# Patient Record
Sex: MALE | Race: WHITE | NOT HISPANIC OR LATINO | URBAN - METROPOLITAN AREA
[De-identification: names, ages, dates, MRNs, and addresses within clinical notes are randomized per-mention and may not be internally consistent; named-entity substitution may affect disease eponyms.]

---

## 2021-04-19 ENCOUNTER — INPATIENT (INPATIENT)
Facility: HOSPITAL | Age: 71
LOS: 7 days | Discharge: HOME | DRG: 445 | End: 2021-04-27
Attending: SURGERY | Admitting: SURGERY
Payer: COMMERCIAL

## 2021-04-19 VITALS
DIASTOLIC BLOOD PRESSURE: 92 MMHG | RESPIRATION RATE: 18 BRPM | HEIGHT: 72 IN | SYSTOLIC BLOOD PRESSURE: 172 MMHG | HEART RATE: 90 BPM | OXYGEN SATURATION: 97 % | TEMPERATURE: 98 F

## 2021-04-19 LAB
ALBUMIN SERPL ELPH-MCNC: 3.1 G/DL — LOW (ref 3.5–5)
ALP SERPL-CCNC: 82 U/L — SIGNIFICANT CHANGE UP (ref 40–120)
ALT FLD-CCNC: 35 U/L DA — SIGNIFICANT CHANGE UP (ref 10–60)
ANION GAP SERPL CALC-SCNC: 7 MMOL/L — SIGNIFICANT CHANGE UP (ref 5–17)
APPEARANCE UR: CLEAR — SIGNIFICANT CHANGE UP
APTT BLD: 37.5 SEC — HIGH (ref 27.5–35.5)
AST SERPL-CCNC: 30 U/L — SIGNIFICANT CHANGE UP (ref 10–40)
BASOPHILS # BLD AUTO: 0.05 K/UL — SIGNIFICANT CHANGE UP (ref 0–0.2)
BASOPHILS NFR BLD AUTO: 0.3 % — SIGNIFICANT CHANGE UP (ref 0–2)
BILIRUB SERPL-MCNC: 0.3 MG/DL — SIGNIFICANT CHANGE UP (ref 0.2–1.2)
BILIRUB UR-MCNC: NEGATIVE — SIGNIFICANT CHANGE UP
BUN SERPL-MCNC: 17 MG/DL — SIGNIFICANT CHANGE UP (ref 7–18)
CALCIUM SERPL-MCNC: 8.8 MG/DL — SIGNIFICANT CHANGE UP (ref 8.4–10.5)
CHLORIDE SERPL-SCNC: 101 MMOL/L — SIGNIFICANT CHANGE UP (ref 96–108)
CO2 SERPL-SCNC: 26 MMOL/L — SIGNIFICANT CHANGE UP (ref 22–31)
COLOR SPEC: ABNORMAL
CREAT SERPL-MCNC: 1.4 MG/DL — HIGH (ref 0.5–1.3)
DIFF PNL FLD: NEGATIVE — SIGNIFICANT CHANGE UP
EOSINOPHIL # BLD AUTO: 0.1 K/UL — SIGNIFICANT CHANGE UP (ref 0–0.5)
EOSINOPHIL NFR BLD AUTO: 0.7 % — SIGNIFICANT CHANGE UP (ref 0–6)
GLUCOSE SERPL-MCNC: 126 MG/DL — HIGH (ref 70–99)
GLUCOSE UR QL: NEGATIVE — SIGNIFICANT CHANGE UP
HCT VFR BLD CALC: 38.4 % — LOW (ref 39–50)
HGB BLD-MCNC: 12.3 G/DL — LOW (ref 13–17)
IMM GRANULOCYTES NFR BLD AUTO: 1.1 % — SIGNIFICANT CHANGE UP (ref 0–1.5)
INR BLD: 1.34 RATIO — HIGH (ref 0.88–1.16)
KETONES UR-MCNC: ABNORMAL
LACTATE SERPL-SCNC: 1.3 MMOL/L — SIGNIFICANT CHANGE UP (ref 0.7–2)
LEUKOCYTE ESTERASE UR-ACNC: ABNORMAL
LIDOCAIN IGE QN: 147 U/L — SIGNIFICANT CHANGE UP (ref 73–393)
LYMPHOCYTES # BLD AUTO: 0.94 K/UL — LOW (ref 1–3.3)
LYMPHOCYTES # BLD AUTO: 6.5 % — LOW (ref 13–44)
MCHC RBC-ENTMCNC: 30.4 PG — SIGNIFICANT CHANGE UP (ref 27–34)
MCHC RBC-ENTMCNC: 32 GM/DL — SIGNIFICANT CHANGE UP (ref 32–36)
MCV RBC AUTO: 95 FL — SIGNIFICANT CHANGE UP (ref 80–100)
MONOCYTES # BLD AUTO: 0.99 K/UL — HIGH (ref 0–0.9)
MONOCYTES NFR BLD AUTO: 6.9 % — SIGNIFICANT CHANGE UP (ref 2–14)
NEUTROPHILS # BLD AUTO: 12.15 K/UL — HIGH (ref 1.8–7.4)
NEUTROPHILS NFR BLD AUTO: 84.5 % — HIGH (ref 43–77)
NITRITE UR-MCNC: POSITIVE
NRBC # BLD: 0 /100 WBCS — SIGNIFICANT CHANGE UP (ref 0–0)
PH UR: 5 — SIGNIFICANT CHANGE UP (ref 5–8)
PLATELET # BLD AUTO: 374 K/UL — SIGNIFICANT CHANGE UP (ref 150–400)
POTASSIUM SERPL-MCNC: 3.4 MMOL/L — LOW (ref 3.5–5.3)
POTASSIUM SERPL-SCNC: 3.4 MMOL/L — LOW (ref 3.5–5.3)
PROT SERPL-MCNC: 7.3 G/DL — SIGNIFICANT CHANGE UP (ref 6–8.3)
PROT UR-MCNC: 30 MG/DL
PROTHROM AB SERPL-ACNC: 15.7 SEC — HIGH (ref 10.6–13.6)
RBC # BLD: 4.04 M/UL — LOW (ref 4.2–5.8)
RBC # FLD: 12.2 % — SIGNIFICANT CHANGE UP (ref 10.3–14.5)
SARS-COV-2 RNA SPEC QL NAA+PROBE: SIGNIFICANT CHANGE UP
SODIUM SERPL-SCNC: 134 MMOL/L — LOW (ref 135–145)
SP GR SPEC: 1.02 — SIGNIFICANT CHANGE UP (ref 1.01–1.02)
TROPONIN I SERPL-MCNC: <0.015 NG/ML — SIGNIFICANT CHANGE UP (ref 0–0.04)
UROBILINOGEN FLD QL: 1
WBC # BLD: 14.39 K/UL — HIGH (ref 3.8–10.5)
WBC # FLD AUTO: 14.39 K/UL — HIGH (ref 3.8–10.5)

## 2021-04-19 PROCEDURE — 71045 X-RAY EXAM CHEST 1 VIEW: CPT | Mod: 26

## 2021-04-19 PROCEDURE — 74177 CT ABD & PELVIS W/CONTRAST: CPT | Mod: 26,MA

## 2021-04-19 PROCEDURE — 99285 EMERGENCY DEPT VISIT HI MDM: CPT

## 2021-04-19 PROCEDURE — 76705 ECHO EXAM OF ABDOMEN: CPT | Mod: 26

## 2021-04-19 RX ORDER — CEFTRIAXONE 500 MG/1
1000 INJECTION, POWDER, FOR SOLUTION INTRAMUSCULAR; INTRAVENOUS ONCE
Refills: 0 | Status: COMPLETED | OUTPATIENT
Start: 2021-04-19 | End: 2021-04-19

## 2021-04-19 RX ORDER — MORPHINE SULFATE 50 MG/1
4 CAPSULE, EXTENDED RELEASE ORAL ONCE
Refills: 0 | Status: DISCONTINUED | OUTPATIENT
Start: 2021-04-19 | End: 2021-04-19

## 2021-04-19 RX ADMIN — MORPHINE SULFATE 4 MILLIGRAM(S): 50 CAPSULE, EXTENDED RELEASE ORAL at 20:18

## 2021-04-19 RX ADMIN — CEFTRIAXONE 100 MILLIGRAM(S): 500 INJECTION, POWDER, FOR SOLUTION INTRAMUSCULAR; INTRAVENOUS at 20:51

## 2021-04-19 RX ADMIN — MORPHINE SULFATE 4 MILLIGRAM(S): 50 CAPSULE, EXTENDED RELEASE ORAL at 20:44

## 2021-04-19 NOTE — ED PROVIDER NOTE - PHYSICAL EXAMINATION
Afebrile, hemodynamically stable, saturating well  NAD, well appearing, sitting comfortably in bed  Head NCAT  EOMI grossly, anicteric  MMM  No JVD  RRR, nml S1/S2, no m/r/g  Lungs CTAB, no w/r/r  Abd soft, RUQ TTP +guarding +Contreras's, ND, nml BS, no CVAT  AAO, CN's 3-12 grossly intact  POLANCO spontaneously, no leg cyanosis or edema  Skin warm, well perfused, no rashes or hives

## 2021-04-19 NOTE — ED PROVIDER NOTE - OBJECTIVE STATEMENT
Declines , uses son Yaya ([phone]).  71yoM with h/o HTN, HLD, presents with RUQ abdominal pain radiating to R flank x 2 days, constant, worsened by leaning over. Took Tylenol prior to arrival. Labs with PMD Cioczek on 4/12 showed WBC 13, BUN/Cr 44/1.8 and prescribed Augmentin/Flagyl/omeprazole. Associated watery diarrhea x1 wk. Denies CP, SOB, vomiting, bloody stool, dys/hematuria, fever, and all other symptoms.

## 2021-04-19 NOTE — ED ADULT NURSE NOTE - CAS EDP DISCH DISPOSITION ADMI
S/W radiology resident on call ([phone]), as per resident there is no thrombosis seen on CT venogram.  Lovenox will be discontinued     VICTOR M ADAMS [id number] HOLDING

## 2021-04-19 NOTE — ED PROVIDER NOTE - CARE PLAN
Principal Discharge DX:	Abscess, gallbladder   Principal Discharge DX:	Abscess, gallbladder  Secondary Diagnosis:	UTI (urinary tract infection)

## 2021-04-19 NOTE — ED PROVIDER NOTE - CLINICAL SUMMARY MEDICAL DECISION MAKING FREE TEXT BOX
No CP/SOB to suggest ACS or PE and no e/o DVT or risk factors. Character and appearance low suspicion for dissection or AAA. No CP/SOB to suggest ACS or PE and no e/o DVT or risk factors. Character and appearance low suspicion for dissection or AAA. Noted +UTI and may be pyelo though no CVAT or urinary symptoms, given abx. Concern for cholecystitis by positioning and exam. Labs unremarkable in this regard. US difficult to assess. No CP/SOB to suggest ACS or PE and no e/o DVT or risk factors. Character and appearance low suspicion for dissection or AAA. Noted +UTI and may be pyelo though no CVAT or urinary symptoms, given abx. Concern for cholecystitis by positioning and exam. Labs unremarkable in this regard. US difficult to assess. Signed off care to Dr. NICOLASA Gusman who will f/u CT and reassess.

## 2021-04-19 NOTE — ED PROVIDER NOTE - PROGRESS NOTE DETAILS
Signed off care to Dr. NICOLASA Gusman who will f/u CT and reassess. patient signeodut to me by Dr. martinez at 11pm. awaiting CT A/P.  CT shows Right upper quadrant inflammatory change at the hepatic flexure with 2 rim-enhancing fluid collections noted. There is nodular soft tissue extending between the hepatic flexure, gastric antrum, and left hepatic lobe.Normal gallbladder is not seen. Query prior cholecystectomy versus contracted gallbladder.Differential for the constellation of findings includes perforated cholecystitis, infectious process involving the hepatic flexure, or malignancy (such as gallbladder, colon, or less likely gastric origin).Indeterminant left renal lesion may represent a hyperdense cyst, however, this can be evaluated with a dedicated renal ultrasound on a nonemergent basis.  @1230am consulted surgical house officer YUDI Ernandez who agrees with plan for surgical admission.  discussed above with patient and son over phone. Awilda CHARLES

## 2021-04-20 DIAGNOSIS — K81.0 ACUTE CHOLECYSTITIS: ICD-10-CM

## 2021-04-20 LAB
CANCER AG19-9 SERPL-ACNC: 4 U/ML — SIGNIFICANT CHANGE UP
CEA SERPL-MCNC: 0.8 NG/ML — SIGNIFICANT CHANGE UP (ref 0–3.8)
HCT VFR BLD CALC: 33 % — LOW (ref 39–50)
HGB BLD-MCNC: 10.7 G/DL — LOW (ref 13–17)
MCHC RBC-ENTMCNC: 31.4 PG — SIGNIFICANT CHANGE UP (ref 27–34)
MCHC RBC-ENTMCNC: 32.4 GM/DL — SIGNIFICANT CHANGE UP (ref 32–36)
MCV RBC AUTO: 96.8 FL — SIGNIFICANT CHANGE UP (ref 80–100)
NRBC # BLD: 0 /100 WBCS — SIGNIFICANT CHANGE UP (ref 0–0)
PLATELET # BLD AUTO: 317 K/UL — SIGNIFICANT CHANGE UP (ref 150–400)
RBC # BLD: 3.41 M/UL — LOW (ref 4.2–5.8)
RBC # FLD: 12.7 % — SIGNIFICANT CHANGE UP (ref 10.3–14.5)
WBC # BLD: 10.83 K/UL — HIGH (ref 3.8–10.5)
WBC # FLD AUTO: 10.83 K/UL — HIGH (ref 3.8–10.5)

## 2021-04-20 PROCEDURE — 99222 1ST HOSP IP/OBS MODERATE 55: CPT

## 2021-04-20 RX ORDER — PIPERACILLIN AND TAZOBACTAM 4; .5 G/20ML; G/20ML
3.38 INJECTION, POWDER, LYOPHILIZED, FOR SOLUTION INTRAVENOUS EVERY 8 HOURS
Refills: 0 | Status: COMPLETED | OUTPATIENT
Start: 2021-04-20 | End: 2021-04-26

## 2021-04-20 RX ORDER — KETOROLAC TROMETHAMINE 30 MG/ML
15 SYRINGE (ML) INJECTION EVERY 6 HOURS
Refills: 0 | Status: DISCONTINUED | OUTPATIENT
Start: 2021-04-20 | End: 2021-04-20

## 2021-04-20 RX ORDER — PIPERACILLIN AND TAZOBACTAM 4; .5 G/20ML; G/20ML
3.38 INJECTION, POWDER, LYOPHILIZED, FOR SOLUTION INTRAVENOUS ONCE
Refills: 0 | Status: COMPLETED | OUTPATIENT
Start: 2021-04-20 | End: 2021-04-20

## 2021-04-20 RX ORDER — ONDANSETRON 8 MG/1
4 TABLET, FILM COATED ORAL EVERY 6 HOURS
Refills: 0 | Status: DISCONTINUED | OUTPATIENT
Start: 2021-04-20 | End: 2021-04-27

## 2021-04-20 RX ORDER — DEXTROSE MONOHYDRATE, SODIUM CHLORIDE, AND POTASSIUM CHLORIDE 50; .745; 4.5 G/1000ML; G/1000ML; G/1000ML
1000 INJECTION, SOLUTION INTRAVENOUS
Refills: 0 | Status: DISCONTINUED | OUTPATIENT
Start: 2021-04-20 | End: 2021-04-27

## 2021-04-20 RX ORDER — HEPARIN SODIUM 5000 [USP'U]/ML
5000 INJECTION INTRAVENOUS; SUBCUTANEOUS EVERY 12 HOURS
Refills: 0 | Status: DISCONTINUED | OUTPATIENT
Start: 2021-04-20 | End: 2021-04-22

## 2021-04-20 RX ORDER — SODIUM CHLORIDE 9 MG/ML
500 INJECTION, SOLUTION INTRAVENOUS
Refills: 0 | Status: DISCONTINUED | OUTPATIENT
Start: 2021-04-20 | End: 2021-04-27

## 2021-04-20 RX ADMIN — SODIUM CHLORIDE 999 MILLILITER(S): 9 INJECTION, SOLUTION INTRAVENOUS at 03:48

## 2021-04-20 RX ADMIN — PIPERACILLIN AND TAZOBACTAM 25 GRAM(S): 4; .5 INJECTION, POWDER, LYOPHILIZED, FOR SOLUTION INTRAVENOUS at 17:11

## 2021-04-20 RX ADMIN — Medication 15 MILLIGRAM(S): at 13:48

## 2021-04-20 RX ADMIN — Medication 15 MILLIGRAM(S): at 05:50

## 2021-04-20 RX ADMIN — PIPERACILLIN AND TAZOBACTAM 200 GRAM(S): 4; .5 INJECTION, POWDER, LYOPHILIZED, FOR SOLUTION INTRAVENOUS at 00:45

## 2021-04-20 RX ADMIN — PIPERACILLIN AND TAZOBACTAM 25 GRAM(S): 4; .5 INJECTION, POWDER, LYOPHILIZED, FOR SOLUTION INTRAVENOUS at 08:05

## 2021-04-20 RX ADMIN — HEPARIN SODIUM 5000 UNIT(S): 5000 INJECTION INTRAVENOUS; SUBCUTANEOUS at 17:11

## 2021-04-20 RX ADMIN — DEXTROSE MONOHYDRATE, SODIUM CHLORIDE, AND POTASSIUM CHLORIDE 110 MILLILITER(S): 50; .745; 4.5 INJECTION, SOLUTION INTRAVENOUS at 05:23

## 2021-04-20 RX ADMIN — Medication 15 MILLIGRAM(S): at 05:23

## 2021-04-20 RX ADMIN — Medication 15 MILLIGRAM(S): at 14:10

## 2021-04-20 RX ADMIN — HEPARIN SODIUM 5000 UNIT(S): 5000 INJECTION INTRAVENOUS; SUBCUTANEOUS at 05:34

## 2021-04-20 RX ADMIN — PIPERACILLIN AND TAZOBACTAM 25 GRAM(S): 4; .5 INJECTION, POWDER, LYOPHILIZED, FOR SOLUTION INTRAVENOUS at 23:57

## 2021-04-20 NOTE — PROGRESS NOTE ADULT - ASSESSMENT
possible perforated cholecystitis with abcess, focal RUQ tenderness only    iv abx  non operative management at present time.

## 2021-04-20 NOTE — PROGRESS NOTE ADULT - SUBJECTIVE AND OBJECTIVE BOX
Pt c/o ruq pain no n/v  ICU Vital Signs Last 24 Hrs  T(C): 36.8 (20 Apr 2021 04:48), Max: 36.8 (20 Apr 2021 04:48)  T(F): 98.2 (20 Apr 2021 04:48), Max: 98.2 (20 Apr 2021 04:48)  HR: 79 (20 Apr 2021 04:48) (79 - 90)  BP: 158/70 (20 Apr 2021 04:48) (135/77 - 172/92)  BP(mean): --  ABP: --  ABP(mean): --  RR: 18 (20 Apr 2021 04:48) (16 - 18)  SpO2: 100% (20 Apr 2021 04:48) (95% - 100%)  Alert nad  Abd: soft +ruq tenderness +murphys sign                          12.3   14.39 )-----------( 374      ( 19 Apr 2021 20:24 )             38.4     04-19    134<L>  |  101  |  17  ----------------------------<  126<H>  3.4<L>   |  26  |  1.40<H>    Ca    8.8      19 Apr 2021 20:24    TPro  7.3  /  Alb  3.1<L>  /  TBili  0.3  /  DBili  x   /  AST  30  /  ALT  35  /  AlkPhos  82  04-19

## 2021-04-20 NOTE — H&P ADULT - HISTORY OF PRESENT ILLNESS
71yoM with PMHx of HTN, HLD, presents to the ED with Abdominal Pain x 2 days. Started suddenly pain for about 1 week but increasingly worse in the last 2 days , Located in the RUQ  ,described as constant, worsened by leaning over.  Denies CP, SOB, vomiting, bloody stool, dys/hematuria, fever, and all other symptoms at this time

## 2021-04-20 NOTE — H&P ADULT - ASSESSMENT
71yoM with PMHx of HTN, HLD, presents to the ED with Abdominal Pain . CT is equivocal for perforated edna vs possible malignancy of hepatobiliary origin. +montoya sign. Afebrile, No LFTs +mild leucocytosis.     Admit to Dr Reed surgical services  NPO, IVF   IV ABx  GB US  f/up labs inc tumor markers   DVT PPx 71yoM with PMHx of HTN, HLD, presents to the ED with Abdominal Pain . CT is equivocal for perforated edna vs infectious process vs possible malignancy of hepatobiliary origin. +montoya sign. Afebrile, No LFTs +mild leucocytosis.     Admit to Dr Reed surgical services  NPO, IVF   IV ABx  GB US  f/up labs inc tumor markers   DVT PPx 71yoM with PMHx of HTN, HLD, presents to the ED with Abdominal Pain . CT is equivocal for perforated edna vs infectious process vs possible malignancy of hepatobiliary origin. +montoya sign. Afebrile, No LFTs +mild leucocytosis.     Admit to Dr Reed surgical services  NPO, IVF   IV ABx  f/up labs inc tumor markers   DVT PPx

## 2021-04-20 NOTE — H&P ADULT - NSHPPHYSICALEXAM_GEN_ALL_CORE
Vital Signs Last 24 Hrs  T(C): 36.7 (19 Apr 2021 23:58), Max: 36.7 (19 Apr 2021 23:58)  T(F): 98 (19 Apr 2021 23:58), Max: 98 (19 Apr 2021 23:58)  HR: 81 (19 Apr 2021 23:58) (81 - 90)  BP: 135/77 (19 Apr 2021 23:58) (135/77 - 172/92)  BP(mean): --  RR: 16 (19 Apr 2021 23:58) (16 - 18)  SpO2: 95% (19 Apr 2021 23:58) (95% - 97%)    General:  A&Ox3,Appears stated age, No acute distress,  Head: NC/AT  EENT: PERRLA. EOMI. Conjunctiva and sclera clear. Pharynx clear.  Neck: Supple. No JVD  Lungs: CTA B/l. Nonlabored Respirations  CV: +S1S2, RRR  Abdomen: Soft, Nondistended,  +TTP in the RUQ, no guarding, no rebound  Extremities: Warm and well perfused. 2+ peripheral pulses b/l. Calf soft, nontender b/l. No pedal edema.

## 2021-04-20 NOTE — H&P ADULT - NSHPLABSRESULTS_GEN_ALL_CORE
12.3   14.39 )-----------( 374      ( 19 Apr 2021 20:24 )             38.4   04-19    134<L>  |  101  |  17  ----------------------------<  126<H>  3.4<L>   |  26  |  1.40<H>    Ca    8.8      19 Apr 2021 20:24    TPro  7.3  /  Alb  3.1<L>  /  TBili  0.3  /  DBili  x   /  AST  30  /  ALT  35  /  AlkPhos  82  04-19    < from: CT Abdomen and Pelvis w/ IV Cont (04.19.21 @ 23:08) >    FINDINGS:  LOWER CHEST: Tiny hiatalhernia.    LIVER: Within normal limits.  BILE DUCTS: Normal caliber.  GALLBLADDER: A subcentimeter enhancing cystic structure in the gallbladder fossa may represent gallbladder remnant versus contracted gallbladder..  SPLEEN: Within normal limits.  PANCREAS: Within normal limits.  ADRENALS: Within normal limits.  KIDNEYS/URETERS: A 2 cm exophytic indeterminate hypodense lesion in the upper pole the left kidney. Symmetric renal enhancement. Right upper pole renal cortical scarring. No hydronephrosis..    BLADDER: Within normal limits.  REPRODUCTIVE ORGANS: Mildly enlarged prostate gland.    BOWEL: Large amount of stool in the proximal colon. No bowel obstruction. Appendix is not visualized. No evidence of inflammation in the pericecal region.  PERITONEUM: Inflammatory change abuts the serosal surface of the hepatic flexure where there is nodular soft tissue extending from the colon to adjacent gastric antrum and serosal surface of the left hepatic lobe. Adjacent to the hepatic flexure there our 2 rim-enhancing fluid collections. The inferior collection measures 3.0 x 2.2 x 2.0 cm. The superior collection measures 4.0 x 2.8 x 1.8 cm. There is mild perihepatic ascites and thickening of the adjacent parietal peritoneal surface.  VESSELS: Atherosclerotic changes.  RETROPERITONEUM/LYMPH NODES: No lymphadenopathy.  ABDOMINAL WALL: Within normal limits.  BONES: Degenerative changes.    IMPRESSION:  Right upper quadrant inflammatory change at the hepatic flexure with 2 rim-enhancing fluid collections noted. There is nodular soft tissue extending between the hepatic flexure, gastric antrum, and left hepatic lobe.    Normal gallbladder is not seen. Query prior cholecystectomy versus contracted gallbladder.    Differential for the constellation of findings includes perforated cholecystitis, infectious process involving the hepatic flexure, or malignancy (such as gallbladder, colon, or less likely gastric origin).    Indeterminant left renal lesion may represent a hyperdense cyst, however, this can be evaluated with a dedicated renal ultrasound on a nonemergent basis.      < end of copied text >

## 2021-04-21 LAB
ANION GAP SERPL CALC-SCNC: 2 MMOL/L — LOW (ref 5–17)
BUN SERPL-MCNC: 9 MG/DL — SIGNIFICANT CHANGE UP (ref 7–18)
CALCIUM SERPL-MCNC: 8.6 MG/DL — SIGNIFICANT CHANGE UP (ref 8.4–10.5)
CHLORIDE SERPL-SCNC: 107 MMOL/L — SIGNIFICANT CHANGE UP (ref 96–108)
CO2 SERPL-SCNC: 28 MMOL/L — SIGNIFICANT CHANGE UP (ref 22–31)
COVID-19 SPIKE DOMAIN AB INTERP: NEGATIVE — SIGNIFICANT CHANGE UP
COVID-19 SPIKE DOMAIN ANTIBODY RESULT: 0.67 U/ML — SIGNIFICANT CHANGE UP
CREAT SERPL-MCNC: 1.12 MG/DL — SIGNIFICANT CHANGE UP (ref 0.5–1.3)
CULTURE RESULTS: NO GROWTH — SIGNIFICANT CHANGE UP
GLUCOSE SERPL-MCNC: 109 MG/DL — HIGH (ref 70–99)
HCT VFR BLD CALC: 34.8 % — LOW (ref 39–50)
HCV AB S/CO SERPL IA: 0.1 S/CO — SIGNIFICANT CHANGE UP (ref 0–0.99)
HCV AB SERPL-IMP: SIGNIFICANT CHANGE UP
HGB BLD-MCNC: 11.2 G/DL — LOW (ref 13–17)
MCHC RBC-ENTMCNC: 30.9 PG — SIGNIFICANT CHANGE UP (ref 27–34)
MCHC RBC-ENTMCNC: 32.2 GM/DL — SIGNIFICANT CHANGE UP (ref 32–36)
MCV RBC AUTO: 96.1 FL — SIGNIFICANT CHANGE UP (ref 80–100)
NRBC # BLD: 0 /100 WBCS — SIGNIFICANT CHANGE UP (ref 0–0)
PLATELET # BLD AUTO: 362 K/UL — SIGNIFICANT CHANGE UP (ref 150–400)
POTASSIUM SERPL-MCNC: 3.7 MMOL/L — SIGNIFICANT CHANGE UP (ref 3.5–5.3)
POTASSIUM SERPL-SCNC: 3.7 MMOL/L — SIGNIFICANT CHANGE UP (ref 3.5–5.3)
RBC # BLD: 3.62 M/UL — LOW (ref 4.2–5.8)
RBC # FLD: 12.3 % — SIGNIFICANT CHANGE UP (ref 10.3–14.5)
SARS-COV-2 IGG+IGM SERPL QL IA: 0.67 U/ML — SIGNIFICANT CHANGE UP
SARS-COV-2 IGG+IGM SERPL QL IA: NEGATIVE — SIGNIFICANT CHANGE UP
SODIUM SERPL-SCNC: 137 MMOL/L — SIGNIFICANT CHANGE UP (ref 135–145)
SPECIMEN SOURCE: SIGNIFICANT CHANGE UP
WBC # BLD: 10.3 K/UL — SIGNIFICANT CHANGE UP (ref 3.8–10.5)
WBC # FLD AUTO: 10.3 K/UL — SIGNIFICANT CHANGE UP (ref 3.8–10.5)

## 2021-04-21 PROCEDURE — 99232 SBSQ HOSP IP/OBS MODERATE 35: CPT

## 2021-04-21 RX ADMIN — HEPARIN SODIUM 5000 UNIT(S): 5000 INJECTION INTRAVENOUS; SUBCUTANEOUS at 17:02

## 2021-04-21 RX ADMIN — PIPERACILLIN AND TAZOBACTAM 25 GRAM(S): 4; .5 INJECTION, POWDER, LYOPHILIZED, FOR SOLUTION INTRAVENOUS at 16:58

## 2021-04-21 RX ADMIN — HEPARIN SODIUM 5000 UNIT(S): 5000 INJECTION INTRAVENOUS; SUBCUTANEOUS at 05:10

## 2021-04-21 RX ADMIN — DEXTROSE MONOHYDRATE, SODIUM CHLORIDE, AND POTASSIUM CHLORIDE 110 MILLILITER(S): 50; .745; 4.5 INJECTION, SOLUTION INTRAVENOUS at 05:10

## 2021-04-21 RX ADMIN — PIPERACILLIN AND TAZOBACTAM 25 GRAM(S): 4; .5 INJECTION, POWDER, LYOPHILIZED, FOR SOLUTION INTRAVENOUS at 23:48

## 2021-04-21 RX ADMIN — PIPERACILLIN AND TAZOBACTAM 25 GRAM(S): 4; .5 INJECTION, POWDER, LYOPHILIZED, FOR SOLUTION INTRAVENOUS at 08:40

## 2021-04-21 NOTE — PROGRESS NOTE ADULT - ASSESSMENT
71M with possible perforated cholecystitis with abscess    - NPO/IVF  - IV abx  - Will continue non-operative management at present time  - Repeat imaging tomorrow, will discuss with IR if able to drain collection  - Discussed with Dr. Reed

## 2021-04-21 NOTE — PROGRESS NOTE ADULT - SUBJECTIVE AND OBJECTIVE BOX
INTERVAL HPI/OVERNIGHT EVENTS:  No acute events overnight. Pt reports ruq abd pain worse when standing/sitting up.  [place]ish  ID#[id number]    MEDICATIONS  (STANDING):  dextrose 5% + sodium chloride 0.45% with potassium chloride 20 mEq/L 1000 milliLiter(s) (110 mL/Hr) IV Continuous <Continuous>  heparin   Injectable 5000 Unit(s) SubCutaneous every 12 hours  lactated ringers. 500 milliLiter(s) (999 mL/Hr) IV Continuous <Continuous>  piperacillin/tazobactam IVPB.. 3.375 Gram(s) IV Intermittent every 8 hours    MEDICATIONS  (PRN):  ketorolac   Injectable 15 milliGRAM(s) IV Push every 6 hours PRN Moderate Pain (4 - 6)  ondansetron Injectable 4 milliGRAM(s) IV Push every 6 hours PRN Nausea      Vital Signs Last 24 Hrs  T(C): 36.9 (21 Apr 2021 05:10), Max: 36.9 (20 Apr 2021 21:12)  T(F): 98.5 (21 Apr 2021 05:10), Max: 98.5 (21 Apr 2021 05:10)  HR: 71 (21 Apr 2021 05:10) (70 - 76)  BP: 149/51 (21 Apr 2021 05:10) (130/62 - 149/51)  BP(mean): --  RR: 18 (21 Apr 2021 05:10) (17 - 18)  SpO2: 99% (21 Apr 2021 05:10) (97% - 100%)    Physical:  General: Alert and oriented, not in acute distress  Resp: Breathing unlabored  Abdomen: Soft, ++tenderness in right upper abdomen, non-peritonitic  Extremities: No pedal edema    I&O's Detail    20 Apr 2021 07:01  -  21 Apr 2021 07:00  --------------------------------------------------------  IN:  Total IN: 0 mL    OUT:    Voided (mL): 500 mL  Total OUT: 500 mL  Total NET: -500 mL    LABS:                      11.2   10.30 )-----------( 362      ( 21 Apr 2021 06:35 )             34.8             04-21    137  |  107  |  9   ----------------------------<  109<H>  3.7   |  28  |  1.12    Ca    8.6      21 Apr 2021 06:35    TPro  7.3  /  Alb  3.1<L>  /  TBili  0.3  /  DBili  x   /  AST  30  /  ALT  35  /  AlkPhos  82  04-19

## 2021-04-21 NOTE — CONSULT NOTE ADULT - SUBJECTIVE AND OBJECTIVE BOX
HPI:  71yoM with PMHx of HTN, HLD, presents to the ED with Abdominal Pain x 2 days. Started suddenly pain for about 1 week but increasingly worse in the last 2 days , Located in the RUQ  ,described as constant, worsened by leaning over.  Denies CP, SOB, vomiting, bloody stool, dys/hematuria, fever, and all other symptoms at this time (2021 03:08)      PAST MEDICAL & SURGICAL HISTORY:  Hypertension    HLD (hyperlipidemia)        No Known Allergies      Meds:  dextrose 5% + sodium chloride 0.45% with potassium chloride 20 mEq/L 1000 milliLiter(s) IV Continuous <Continuous>  heparin   Injectable 5000 Unit(s) SubCutaneous every 12 hours  ketorolac   Injectable 15 milliGRAM(s) IV Push every 6 hours PRN  lactated ringers. 500 milliLiter(s) IV Continuous <Continuous>  ondansetron Injectable 4 milliGRAM(s) IV Push every 6 hours PRN  piperacillin/tazobactam IVPB.. 3.375 Gram(s) IV Intermittent every 8 hours      SOCIAL HISTORY:  Smoker:  YES / NO        PACK YEARS:                         WHEN QUIT?  ETOH use:  YES / NO               FREQUENCY / QUANTITY:  Ilicit Drug use:  YES / NO  Occupation:  Assisted device use (Cane / Walker):  Live with:    FAMILY HISTORY:      VITALS:  Vital Signs Last 24 Hrs  T(C): 36.7 (2021 13:17), Max: 36.9 (2021 21:12)  T(F): 98.1 (2021 13:17), Max: 98.5 (2021 05:10)  HR: 68 (2021 13:17) (68 - 76)  BP: 148/63 (2021 13:17) (135/66 - 149/51)  BP(mean): --  RR: 18 (2021 13:17) (17 - 18)  SpO2: 98% (2021 13:17) (97% - 99%)    LABS/DIAGNOSTIC TESTS:                          11.2   10.30 )-----------( 362      ( 2021 06:35 )             34.8     WBC Count: 10.30 K/uL ( @ 06:35)  WBC Count: 10.83 K/uL (04-20 @ 12:31)  WBC Count: 14.39 K/uL ( @ 20:24)          137  |  107  |  9   ----------------------------<  109<H>  3.7   |  28  |  1.12    Ca    8.6      2021 06:35    TPro  7.3  /  Alb  3.1<L>  /  TBili  0.3  /  DBili  x   /  AST  30  /  ALT  35  /  AlkPhos  82        Urinalysis Basic - ( 2021 20:24 )    Color: Lynda / Appearance: Clear / S.020 / pH: x  Gluc: x / Ketone: Trace  / Bili: Negative / Urobili: 1   Blood: x / Protein: 30 mg/dL / Nitrite: Positive   Leuk Esterase: Small / RBC: 0-2 /HPF / WBC 3-5 /HPF   Sq Epi: x / Non Sq Epi: x / Bacteria: Few /HPF        LIVER FUNCTIONS - ( 2021 20:24 )  Alb: 3.1 g/dL / Pro: 7.3 g/dL / ALK PHOS: 82 U/L / ALT: 35 U/L DA / AST: 30 U/L / GGT: x             PT/INR - ( 2021 20:24 )   PT: 15.7 sec;   INR: 1.34 ratio         PTT - ( 2021 20:24 )  PTT:37.5 sec    LACTATE:    ABG -     CULTURES:   .Urine Clean Catch (Midstream)   @ 03:18   No growth  --  --          RADIOLOGY:< from: CT Abdomen and Pelvis w/ IV Cont (21 @ 23:08) >  EXAM:  CT ABDOMEN AND PELVIS IC                            PROCEDURE DATE:  2021          INTERPRETATION:  CLINICAL INFORMATION: Right upper quadrant pain    COMPARISON: None.    CONTRAST/COMPLICATIONS:  IV Contrast: Omnipaque 350  90 cc administered   10 cc discarded  Oral Contrast: NONE  Complications: None reported at time of study completion    PROCEDURE:  CT of the Abdomen and Pelvis was performed.  Sagittal and coronal reformats were performed.    FINDINGS:  LOWER CHEST: Tiny hiatalhernia.    LIVER: Within normal limits.  BILE DUCTS: Normal caliber.  GALLBLADDER: A subcentimeter enhancing cystic structure in the gallbladder fossa may represent gallbladder remnant versus contracted gallbladder..  SPLEEN: Within normal limits.  PANCREAS: Within normal limits.  ADRENALS: Within normal limits.  KIDNEYS/URETERS: A 2 cm exophytic indeterminate hypodense lesion in the upper pole the left kidney. Symmetric renal enhancement. Right upper pole renal cortical scarring. No hydronephrosis..    BLADDER: Within normal limits.  REPRODUCTIVE ORGANS: Mildly enlarged prostate gland.    BOWEL: Large amount of stool in the proximal colon. No bowel obstruction. Appendix is not visualized. No evidence of inflammation in the pericecal region.  PERITONEUM: Inflammatory change abuts the serosal surface of the hepatic flexure where there is nodular soft tissue extending from the colon to adjacent gastric antrum and serosal surface of the left hepatic lobe. Adjacent to the hepatic flexure there our 2 rim-enhancing fluid collections. The inferior collection measures 3.0 x 2.2 x 2.0 cm. The superior collection measures 4.0 x 2.8 x 1.8 cm. There is mild perihepatic ascites and thickening of the adjacent parietal peritoneal surface.  VESSELS: Atherosclerotic changes.  RETROPERITONEUM/LYMPH NODES: No lymphadenopathy.  ABDOMINAL WALL: Within normal limits.  BONES: Degenerative changes.    IMPRESSION:  Right upper quadrant inflammatory change at the hepatic flexure with 2 rim-enhancing fluid collections noted. There is nodular soft tissue extending between the hepatic flexure, gastric antrum, and left hepatic lobe.    Normal gallbladder is not seen. Query prior cholecystectomy versus contracted gallbladder.    Differential for the constellation of findings includes perforated cholecystitis, infectious process involving the hepatic flexure, or malignancy (such as gallbladder, colon, or less likely gastric origin).    Indeterminant left renal lesion may represent a hyperdense cyst, however, this can be evaluated with a dedicated renal ultrasound on a nonemergent basis.              JOSE AGUILAR MD; Attending Radiologist  This document has been electronically signed. 2021 11:58PM    -------------------------------------------------------------------------------------------------------------------------------------------------------------------------------------------------------------------------------------------------------------  EXAM:  US ABDOMEN LIMITED                            PROCEDURE DATE:  2021          INTERPRETATION:  CLINICAL INFORMATION: Right upper quadrant pain    COMPARISON: None available.    TECHNIQUE: Right upper quadrant ultrasound    FINDINGS:    The study is limited due to excessive bowel gas and patient experiencing pain during the exam.    Normal liver length. Hepatic echogenicity within normal limits. Suggestion of dirty shadowing at the remigio hepatis. Further evaluation of the liver is limited as described above.    Common bile duct is normal, measuring 5 mm.    Suboptimal visualization of the completely distended gallbladder. Sonographic Contreras's sign is positive. Mild gallbladder wall thickening of 4 mm.    Pancreas is obscured by bowel gas.    Right kidney measures 9.7 cm in length. No hydronephrosis.      IMPRESSION:    Suboptimal exam for reasons described above.    Poorly visualized gallbladder. Positive sonographic Contreras's sign. Suggestion of dirty shadowing at the remigio hepatis may represent pneumobilia. Recommend CT of the abdomen for further evaluation.                JOSE AGUILAR MD; Attending Radiologist  This document has been electronically signed. 2021 10:10PM    < end of copied text >        JIMENA  [  ] UNABLE TO ELICIT               HPI:  71yoM with PMHx of HTN, HLD, presents to the ED with Abdominal Pain x 2 days. Started suddenly pain for about 1 week but increasingly worse in the last 2 days , Located in the RUQ  ,described as constant, worsened by leaning over.  Denies CP, SOB, vomiting, bloody stool, dys/hematuria, fever, and all other symptoms at this time (2021 03:08)      History as above , pt who is polish speaking and so spoke with him through a Polish  ( ID # [id number], [place]), he presented to the hospital with RUQ abdominal pain x 1 week which has been getting progressively worse over the last 2 days. He has not had any nausea or vomiting, no diarrhea, no fevers or chills. He was found to have what appears to be acute Cholecystitis with a perforated gallbladder and a gallbladder fossa abscess. He is on Zosyn currently and is doing slightly better. He is to be evaluated by IR for possible drainage of his collection.      PAST MEDICAL & SURGICAL HISTORY:  Hypertension    HLD (hyperlipidemia)        No Known Allergies      Meds:  dextrose 5% + sodium chloride 0.45% with potassium chloride 20 mEq/L 1000 milliLiter(s) IV Continuous <Continuous>  heparin   Injectable 5000 Unit(s) SubCutaneous every 12 hours  ketorolac   Injectable 15 milliGRAM(s) IV Push every 6 hours PRN  lactated ringers. 500 milliLiter(s) IV Continuous <Continuous>  ondansetron Injectable 4 milliGRAM(s) IV Push every 6 hours PRN  piperacillin/tazobactam IVPB.. 3.375 Gram(s) IV Intermittent every 8 hours      SOCIAL HISTORY:  Smoker:  no  ETOH use:  socially    FAMILY HISTORY: not contributory      VITALS:  Vital Signs Last 24 Hrs  T(C): 36.7 (2021 13:17), Max: 36.9 (2021 21:12)  T(F): 98.1 (2021 13:17), Max: 98.5 (2021 05:10)  HR: 68 (2021 13:17) (68 - 76)  BP: 148/63 (2021 13:17) (135/66 - 149/51)  BP(mean): --  RR: 18 (2021 13:17) (17 - 18)  SpO2: 98% (2021 13:17) (97% - 99%)    LABS/DIAGNOSTIC TESTS:                          11.2   10.30 )-----------( 362      ( 2021 06:35 )             34.8     WBC Count: 10.30 K/uL ( @ 06:35)  WBC Count: 10.83 K/uL ( @ 12:31)  WBC Count: 14.39 K/uL ( @ 20:24)          137  |  107  |  9   ----------------------------<  109<H>  3.7   |  28  |  1.12    Ca    8.6      2021 06:35    TPro  7.3  /  Alb  3.1<L>  /  TBili  0.3  /  DBili  x   /  AST  30  /  ALT  35  /  AlkPhos  82        Urinalysis Basic - ( 2021 20:24 )    Color: Lynda / Appearance: Clear / S.020 / pH: x  Gluc: x / Ketone: Trace  / Bili: Negative / Urobili: 1   Blood: x / Protein: 30 mg/dL / Nitrite: Positive   Leuk Esterase: Small / RBC: 0-2 /HPF / WBC 3-5 /HPF   Sq Epi: x / Non Sq Epi: x / Bacteria: Few /HPF        LIVER FUNCTIONS - ( 2021 20:24 )  Alb: 3.1 g/dL / Pro: 7.3 g/dL / ALK PHOS: 82 U/L / ALT: 35 U/L DA / AST: 30 U/L / GGT: x             PT/INR - ( 2021 20:24 )   PT: 15.7 sec;   INR: 1.34 ratio         PTT - ( 2021 20:24 )  PTT:37.5 sec    LACTATE:    ABG -     CULTURES:   .Urine Clean Catch (Midstream)   @ 03:18   No growth  --  --          RADIOLOGY:< from: CT Abdomen and Pelvis w/ IV Cont (21 @ 23:08) >  EXAM:  CT ABDOMEN AND PELVIS IC                            PROCEDURE DATE:  2021          INTERPRETATION:  CLINICAL INFORMATION: Right upper quadrant pain    COMPARISON: None.    CONTRAST/COMPLICATIONS:  IV Contrast: Omnipaque 350  90 cc administered   10 cc discarded  Oral Contrast: NONE  Complications: None reported at time of study completion    PROCEDURE:  CT of the Abdomen and Pelvis was performed.  Sagittal and coronal reformats were performed.    FINDINGS:  LOWER CHEST: Tiny hiatalhernia.    LIVER: Within normal limits.  BILE DUCTS: Normal caliber.  GALLBLADDER: A subcentimeter enhancing cystic structure in the gallbladder fossa may represent gallbladder remnant versus contracted gallbladder..  SPLEEN: Within normal limits.  PANCREAS: Within normal limits.  ADRENALS: Within normal limits.  KIDNEYS/URETERS: A 2 cm exophytic indeterminate hypodense lesion in the upper pole the left kidney. Symmetric renal enhancement. Right upper pole renal cortical scarring. No hydronephrosis..    BLADDER: Within normal limits.  REPRODUCTIVE ORGANS: Mildly enlarged prostate gland.    BOWEL: Large amount of stool in the proximal colon. No bowel obstruction. Appendix is not visualized. No evidence of inflammation in the pericecal region.  PERITONEUM: Inflammatory change abuts the serosal surface of the hepatic flexure where there is nodular soft tissue extending from the colon to adjacent gastric antrum and serosal surface of the left hepatic lobe. Adjacent to the hepatic flexure there our 2 rim-enhancing fluid collections. The inferior collection measures 3.0 x 2.2 x 2.0 cm. The superior collection measures 4.0 x 2.8 x 1.8 cm. There is mild perihepatic ascites and thickening of the adjacent parietal peritoneal surface.  VESSELS: Atherosclerotic changes.  RETROPERITONEUM/LYMPH NODES: No lymphadenopathy.  ABDOMINAL WALL: Within normal limits.  BONES: Degenerative changes.    IMPRESSION:  Right upper quadrant inflammatory change at the hepatic flexure with 2 rim-enhancing fluid collections noted. There is nodular soft tissue extending between the hepatic flexure, gastric antrum, and left hepatic lobe.    Normal gallbladder is not seen. Query prior cholecystectomy versus contracted gallbladder.    Differential for the constellation of findings includes perforated cholecystitis, infectious process involving the hepatic flexure, or malignancy (such as gallbladder, colon, or less likely gastric origin).    Indeterminant left renal lesion may represent a hyperdense cyst, however, this can be evaluated with a dedicated renal ultrasound on a nonemergent basis.              JOSE AGUILAR MD; Attending Radiologist  This document has been electronically signed. 2021 11:58PM    -------------------------------------------------------------------------------------------------------------------------------------------------------------------------------------------------------------------------------------------------------------  EXAM:  US ABDOMEN LIMITED                            PROCEDURE DATE:  2021          INTERPRETATION:  CLINICAL INFORMATION: Right upper quadrant pain    COMPARISON: None available.    TECHNIQUE: Right upper quadrant ultrasound    FINDINGS:    The study is limited due to excessive bowel gas and patient experiencing pain during the exam.    Normal liver length. Hepatic echogenicity within normal limits. Suggestion of dirty shadowing at the remigio hepatis. Further evaluation of the liver is limited as described above.    Common bile duct is normal, measuring 5 mm.    Suboptimal visualization of the completely distended gallbladder. Sonographic Contreras's sign is positive. Mild gallbladder wall thickening of 4 mm.    Pancreas is obscured by bowel gas.    Right kidney measures 9.7 cm in length. No hydronephrosis.      IMPRESSION:    Suboptimal exam for reasons described above.    Poorly visualized gallbladder. Positive sonographic Contreras's sign. Suggestion of dirty shadowing at the remigio hepatis may represent pneumobilia. Recommend CT of the abdomen for further evaluation.                JOSE AGUILAR MD; Attending Radiologist  This document has been electronically signed. 2021 10:10PM    < end of copied text >        ROS  [  ] UNABLE TO ELICIT

## 2021-04-22 LAB
ALBUMIN SERPL ELPH-MCNC: 2.2 G/DL — LOW (ref 3.5–5)
ALP SERPL-CCNC: 74 U/L — SIGNIFICANT CHANGE UP (ref 40–120)
ALT FLD-CCNC: 25 U/L DA — SIGNIFICANT CHANGE UP (ref 10–60)
ANION GAP SERPL CALC-SCNC: 5 MMOL/L — SIGNIFICANT CHANGE UP (ref 5–17)
AST SERPL-CCNC: 20 U/L — SIGNIFICANT CHANGE UP (ref 10–40)
BILIRUB SERPL-MCNC: 0.3 MG/DL — SIGNIFICANT CHANGE UP (ref 0.2–1.2)
BUN SERPL-MCNC: 6 MG/DL — LOW (ref 7–18)
CALCIUM SERPL-MCNC: 8.7 MG/DL — SIGNIFICANT CHANGE UP (ref 8.4–10.5)
CHLORIDE SERPL-SCNC: 107 MMOL/L — SIGNIFICANT CHANGE UP (ref 96–108)
CO2 SERPL-SCNC: 27 MMOL/L — SIGNIFICANT CHANGE UP (ref 22–31)
CREAT SERPL-MCNC: 1.05 MG/DL — SIGNIFICANT CHANGE UP (ref 0.5–1.3)
GLUCOSE SERPL-MCNC: 106 MG/DL — HIGH (ref 70–99)
HCT VFR BLD CALC: 33.6 % — LOW (ref 39–50)
HGB BLD-MCNC: 10.8 G/DL — LOW (ref 13–17)
MCHC RBC-ENTMCNC: 31.1 PG — SIGNIFICANT CHANGE UP (ref 27–34)
MCHC RBC-ENTMCNC: 32.1 GM/DL — SIGNIFICANT CHANGE UP (ref 32–36)
MCV RBC AUTO: 96.8 FL — SIGNIFICANT CHANGE UP (ref 80–100)
NRBC # BLD: 0 /100 WBCS — SIGNIFICANT CHANGE UP (ref 0–0)
PLATELET # BLD AUTO: 352 K/UL — SIGNIFICANT CHANGE UP (ref 150–400)
POTASSIUM SERPL-MCNC: 4.1 MMOL/L — SIGNIFICANT CHANGE UP (ref 3.5–5.3)
POTASSIUM SERPL-SCNC: 4.1 MMOL/L — SIGNIFICANT CHANGE UP (ref 3.5–5.3)
PROT SERPL-MCNC: 6.3 G/DL — SIGNIFICANT CHANGE UP (ref 6–8.3)
RBC # BLD: 3.47 M/UL — LOW (ref 4.2–5.8)
RBC # FLD: 12.6 % — SIGNIFICANT CHANGE UP (ref 10.3–14.5)
SARS-COV-2 RNA SPEC QL NAA+PROBE: SIGNIFICANT CHANGE UP
SODIUM SERPL-SCNC: 139 MMOL/L — SIGNIFICANT CHANGE UP (ref 135–145)
WBC # BLD: 10.18 K/UL — SIGNIFICANT CHANGE UP (ref 3.8–10.5)
WBC # FLD AUTO: 10.18 K/UL — SIGNIFICANT CHANGE UP (ref 3.8–10.5)

## 2021-04-22 PROCEDURE — 99232 SBSQ HOSP IP/OBS MODERATE 35: CPT

## 2021-04-22 PROCEDURE — 74177 CT ABD & PELVIS W/CONTRAST: CPT | Mod: 26

## 2021-04-22 RX ORDER — MORPHINE SULFATE 50 MG/1
2 CAPSULE, EXTENDED RELEASE ORAL EVERY 4 HOURS
Refills: 0 | Status: DISCONTINUED | OUTPATIENT
Start: 2021-04-22 | End: 2021-04-27

## 2021-04-22 RX ORDER — DIPHENHYDRAMINE HCL 50 MG
25 CAPSULE ORAL ONCE
Refills: 0 | Status: COMPLETED | OUTPATIENT
Start: 2021-04-22 | End: 2021-04-22

## 2021-04-22 RX ORDER — IOHEXOL 300 MG/ML
30 INJECTION, SOLUTION INTRAVENOUS ONCE
Refills: 0 | Status: COMPLETED | OUTPATIENT
Start: 2021-04-22 | End: 2021-04-22

## 2021-04-22 RX ORDER — METOPROLOL TARTRATE 50 MG
2.5 TABLET ORAL EVERY 6 HOURS
Refills: 0 | Status: DISCONTINUED | OUTPATIENT
Start: 2021-04-22 | End: 2021-04-25

## 2021-04-22 RX ADMIN — Medication 25 MILLIGRAM(S): at 23:06

## 2021-04-22 RX ADMIN — HEPARIN SODIUM 5000 UNIT(S): 5000 INJECTION INTRAVENOUS; SUBCUTANEOUS at 05:37

## 2021-04-22 RX ADMIN — Medication 2.5 MILLIGRAM(S): at 23:07

## 2021-04-22 RX ADMIN — PIPERACILLIN AND TAZOBACTAM 25 GRAM(S): 4; .5 INJECTION, POWDER, LYOPHILIZED, FOR SOLUTION INTRAVENOUS at 16:08

## 2021-04-22 RX ADMIN — MORPHINE SULFATE 2 MILLIGRAM(S): 50 CAPSULE, EXTENDED RELEASE ORAL at 10:04

## 2021-04-22 RX ADMIN — IOHEXOL 30 MILLILITER(S): 300 INJECTION, SOLUTION INTRAVENOUS at 08:57

## 2021-04-22 RX ADMIN — MORPHINE SULFATE 2 MILLIGRAM(S): 50 CAPSULE, EXTENDED RELEASE ORAL at 16:23

## 2021-04-22 RX ADMIN — PIPERACILLIN AND TAZOBACTAM 25 GRAM(S): 4; .5 INJECTION, POWDER, LYOPHILIZED, FOR SOLUTION INTRAVENOUS at 08:05

## 2021-04-22 RX ADMIN — MORPHINE SULFATE 2 MILLIGRAM(S): 50 CAPSULE, EXTENDED RELEASE ORAL at 10:19

## 2021-04-22 RX ADMIN — Medication 2.5 MILLIGRAM(S): at 17:41

## 2021-04-22 RX ADMIN — MORPHINE SULFATE 2 MILLIGRAM(S): 50 CAPSULE, EXTENDED RELEASE ORAL at 16:08

## 2021-04-22 RX ADMIN — DEXTROSE MONOHYDRATE, SODIUM CHLORIDE, AND POTASSIUM CHLORIDE 110 MILLILITER(S): 50; .745; 4.5 INJECTION, SOLUTION INTRAVENOUS at 12:24

## 2021-04-22 NOTE — PROGRESS NOTE ADULT - SUBJECTIVE AND OBJECTIVE BOX
INTERVAL HPI/OVERNIGHT EVENTS:  Pt resting comfortably. Complains of RUQ pain that worsens when he turns on his side or moves around. NPO for 5 days. Denies N/V, no flatus or BM. Afebrile, non-tachycardic, no acute overnight events.    MEDICATIONS  (STANDING):  dextrose 5% + sodium chloride 0.45% with potassium chloride 20 mEq/L 1000 milliLiter(s) (110 mL/Hr) IV Continuous <Continuous>  heparin   Injectable 5000 Unit(s) SubCutaneous every 12 hours  lactated ringers. 500 milliLiter(s) (999 mL/Hr) IV Continuous <Continuous>  piperacillin/tazobactam IVPB.. 3.375 Gram(s) IV Intermittent every 8 hours    MEDICATIONS  (PRN):  ondansetron Injectable 4 milliGRAM(s) IV Push every 6 hours PRN Nausea      Vital Signs Last 24 Hrs  T(C): 36.6 (22 Apr 2021 05:10), Max: 36.8 (21 Apr 2021 20:25)  T(F): 97.8 (22 Apr 2021 05:10), Max: 98.2 (21 Apr 2021 20:25)  HR: 59 (22 Apr 2021 05:10) (59 - 68)  BP: 159/67 (22 Apr 2021 05:10) (148/63 - 159/67)  BP(mean): 81 (21 Apr 2021 20:25) (81 - 81)  RR: 18 (22 Apr 2021 05:10) (17 - 18)  SpO2: 98% (22 Apr 2021 05:10) (98% - 100%)    Physical:  General: A&Ox3. NAD.  Abdomen: Soft nondistended, tender to palpation on the RUQ. No signs of peritonitis (no rebound tenderness or guarding)  Ext: no calf pain, no pitting edema  Lungs: no labored breathing    I&O's Detail    21 Apr 2021 07:01  -  22 Apr 2021 07:00  --------------------------------------------------------  IN:    dextrose 5% + sodium chloride 0.45% w/ Additives: 1210 mL    IV PiggyBack: 100 mL  Total IN: 1310 mL    OUT:  Total OUT: 0 mL    Total NET: 1310 mL          LABS:                        10.8   10.18 )-----------( 352      ( 22 Apr 2021 06:40 )             33.6             04-21    137  |  107  |  9   ----------------------------<  109<H>  3.7   |  28  |  1.12    Ca    8.6      21 Apr 2021 06:35        71y.o. Male

## 2021-04-22 NOTE — PROGRESS NOTE ADULT - ASSESSMENT
Acute perforated cholecystitis   Gallbladder abscess  Leukocytosis - normalized  ·	cont Zosyn 3.375 gms iv q8 hrs  D3  ·	going for repeat CT A/P today  ·	IR intervention depends on CT results    Acute perforated cholecystitis   Gallbladder abscess  Leukocytosis - normalized  ·	cont Zosyn 3.375 gms iv q8 hrs  D3  ·	going for repeat CT A/P today  ·	IR intervention depends on CT results     I agree with above

## 2021-04-22 NOTE — PROGRESS NOTE ADULT - ASSESSMENT
71M with possible perforated cholecystitis with abscess  vss, no leukocytosis  LFTs wnl    - NPO/IVF  - IV abx  - Repeat CT Abdomen  - Consider IR consult for possible drainage pending CT results  - Discussed with Dr. Reed

## 2021-04-22 NOTE — PROGRESS NOTE ADULT - SUBJECTIVE AND OBJECTIVE BOX
71y Male is under our care for acute perforated cholecystitis with gallbladder abscess and leukocytosis. Patient is doing well, but still c/o lingering RLQ abdominal pain. Remains afebrile with normal wbc count. About to go for repeat CT today. IR intervention depends on CT results.     REVIEW OF SYSTEMS:  [  ] Not able to illicit  General: no fevers no malaise  Chest: no cough no sob  GI: no nvd +abd pain  : no urinary sxs   Skin: no rashes  Musculoskeletal: no trauma no LBP  Neuro: no ha's no dizziness 	    MEDS:  piperacillin/tazobactam IVPB.. 3.375 Gram(s) IV Intermittent every 8 hours    ALLERGIES: Allergies    No Known Allergies    Intolerances      VITALS:  Vital Signs Last 24 Hrs  T(C): 36.6 (22 Apr 2021 05:10), Max: 36.8 (21 Apr 2021 20:25)  T(F): 97.8 (22 Apr 2021 05:10), Max: 98.2 (21 Apr 2021 20:25)  HR: 59 (22 Apr 2021 05:10) (59 - 68)  BP: 159/67 (22 Apr 2021 05:10) (148/63 - 159/67)  BP(mean): 81 (21 Apr 2021 20:25) (81 - 81)  RR: 18 (22 Apr 2021 05:10) (17 - 18)  SpO2: 98% (22 Apr 2021 05:10) (98% - 100%)      PHYSICAL EXAM:  HEENT: n/a  Neck: supple no LN's   Respiratory: lungs clear no rales  Cardiovascular: S1 S2 reg no murmurs  Gastrointestinal: +BS with soft, nondistended abdomen; RLQ abdominal tenderness  Extremities: no edema  Skin: no rashes  Ortho: n/a  Neuro: AAO x 3      LABS/DIAGNOSTIC TESTS:                        10.8   10.18 )-----------( 352      ( 22 Apr 2021 06:40 )             33.6     WBC Count: 10.18 K/uL (04-22 @ 06:40)  WBC Count: 10.30 K/uL (04-21 @ 06:35)  WBC Count: 10.83 K/uL (04-20 @ 12:31)  WBC Count: 14.39 K/uL (04-19 @ 20:24)    04-22    139  |  107  |  6<L>  ----------------------------<  106<H>  4.1   |  27  |  1.05    Ca    8.7      22 Apr 2021 06:40    TPro  6.3  /  Alb  2.2<L>  /  TBili  0.3  /  DBili  x   /  AST  20  /  ALT  25  /  AlkPhos  74  04-22      CULTURES:   .Urine Clean Catch (Midstream)  04-20 @ 03:18   No growth  --  --        RADIOLOGY:    4/22 CT A/P - ordered 71y Male is under our care for acute perforated cholecystitis with gallbladder abscess and leukocytosis. Used Polish # [id number]. Patient is doing well, but still c/o lingering RLQ abdominal pain. Remains afebrile with normal wbc count. About to go for repeat CT today. IR intervention depends on CT results.     REVIEW OF SYSTEMS:  [  ] Not able to illicit  General: no fevers no malaise  Chest: no cough no sob  GI: no nvd +abd pain  : no urinary sxs   Skin: no rashes  Musculoskeletal: no trauma no LBP  Neuro: no ha's no dizziness 	    MEDS:  piperacillin/tazobactam IVPB.. 3.375 Gram(s) IV Intermittent every 8 hours    ALLERGIES: Allergies    No Known Allergies    Intolerances      VITALS:  Vital Signs Last 24 Hrs  T(C): 36.6 (22 Apr 2021 05:10), Max: 36.8 (21 Apr 2021 20:25)  T(F): 97.8 (22 Apr 2021 05:10), Max: 98.2 (21 Apr 2021 20:25)  HR: 59 (22 Apr 2021 05:10) (59 - 68)  BP: 159/67 (22 Apr 2021 05:10) (148/63 - 159/67)  BP(mean): 81 (21 Apr 2021 20:25) (81 - 81)  RR: 18 (22 Apr 2021 05:10) (17 - 18)  SpO2: 98% (22 Apr 2021 05:10) (98% - 100%)      PHYSICAL EXAM:  HEENT: n/a  Neck: supple no LN's   Respiratory: lungs clear no rales  Cardiovascular: S1 S2 reg no murmurs  Gastrointestinal: +BS with soft, nondistended abdomen; RLQ abdominal tenderness  Extremities: no edema  Skin: no rashes  Ortho: n/a  Neuro: AAO x 3      LABS/DIAGNOSTIC TESTS:                        10.8   10.18 )-----------( 352      ( 22 Apr 2021 06:40 )             33.6     WBC Count: 10.18 K/uL (04-22 @ 06:40)  WBC Count: 10.30 K/uL (04-21 @ 06:35)  WBC Count: 10.83 K/uL (04-20 @ 12:31)  WBC Count: 14.39 K/uL (04-19 @ 20:24)    04-22    139  |  107  |  6<L>  ----------------------------<  106<H>  4.1   |  27  |  1.05    Ca    8.7      22 Apr 2021 06:40    TPro  6.3  /  Alb  2.2<L>  /  TBili  0.3  /  DBili  x   /  AST  20  /  ALT  25  /  AlkPhos  74  04-22      CULTURES:   .Urine Clean Catch (Midstream)  04-20 @ 03:18   No growth  --  --        RADIOLOGY:    4/22 CT A/P - ordered

## 2021-04-22 NOTE — PROGRESS NOTE ADULT - SUBJECTIVE AND OBJECTIVE BOX
INTERVAL HPI/OVERNIGHT EVENTS:  Pt resting comfortably.   Polish  ID: [id number]  c/o RUQ abdominal pain, states it has not improved however has not worsened  Denied nausea/vomiting  VSS    MEDICATIONS  (STANDING):  dextrose 5% + sodium chloride 0.45% with potassium chloride 20 mEq/L 1000 milliLiter(s) (110 mL/Hr) IV Continuous <Continuous>  heparin   Injectable 5000 Unit(s) SubCutaneous every 12 hours  lactated ringers. 500 milliLiter(s) (999 mL/Hr) IV Continuous <Continuous>  piperacillin/tazobactam IVPB.. 3.375 Gram(s) IV Intermittent every 8 hours    MEDICATIONS  (PRN):  ondansetron Injectable 4 milliGRAM(s) IV Push every 6 hours PRN Nausea    Vital Signs Last 24 Hrs  T(C): 36.6 (22 Apr 2021 05:10), Max: 36.8 (21 Apr 2021 20:25)  T(F): 97.8 (22 Apr 2021 05:10), Max: 98.2 (21 Apr 2021 20:25)  HR: 59 (22 Apr 2021 05:10) (59 - 68)  BP: 159/67 (22 Apr 2021 05:10) (148/63 - 159/67)  BP(mean): 81 (21 Apr 2021 20:25) (81 - 81)  RR: 18 (22 Apr 2021 05:10) (17 - 18)  SpO2: 98% (22 Apr 2021 05:10) (98% - 100%)    Physical:  General: A&Ox3. NAD.  Chest: respirations unlabored  Abdomen: Soft nondistended, moderate RUQ tenderness upon palpation.    I&O's Detail    21 Apr 2021 07:01  -  22 Apr 2021 07:00  --------------------------------------------------------  IN:    dextrose 5% + sodium chloride 0.45% w/ Additives: 1210 mL    IV PiggyBack: 100 mL  Total IN: 1310 mL    OUT:  Total OUT: 0 mL    Total NET: 1310 mL    LABS:                        10.8   10.18 )-----------( 352      ( 22 Apr 2021 06:40 )             33.6             04-22    139  |  107  |  6<L>  ----------------------------<  106<H>  4.1   |  27  |  1.05    Ca    8.7      22 Apr 2021 06:40    TPro  6.3  /  Alb  2.2<L>  /  TBili  0.3  /  DBili  x   /  AST  20  /  ALT  25  /  AlkPhos  74  04-22

## 2021-04-22 NOTE — PROGRESS NOTE ADULT - ASSESSMENT
70 yo Male admitted for perforated cholecystitis with abscesses, on zosyn + ceftriaxone regimen Day 3. Patient has been NPO since admit date 4/19/2021. Patient complains of RUQ abdominal pain that is not worsening. Patient cannot ambulate due to abdominal pain.    Plan  1. NPO  2. continue IVF and IV abx  3. consult IR for possible drainage of fluid  4. repeat CT abdomen today

## 2021-04-22 NOTE — PROGRESS NOTE ADULT - SUBJECTIVE AND OBJECTIVE BOX
Patient for possible aspiration of an abdominal in IR tomorrow.  Please keep patient NPO, send early AM labs including CBC, BMP, and PT,INR/ PTT, hold all anticoagulation. Please send Covid PCR today.

## 2021-04-23 ENCOUNTER — RESULT REVIEW (OUTPATIENT)
Age: 71
End: 2021-04-23

## 2021-04-23 LAB
ALBUMIN SERPL ELPH-MCNC: 2.7 G/DL — LOW (ref 3.5–5)
ALP SERPL-CCNC: 94 U/L — SIGNIFICANT CHANGE UP (ref 40–120)
ALT FLD-CCNC: 28 U/L DA — SIGNIFICANT CHANGE UP (ref 10–60)
ANION GAP SERPL CALC-SCNC: 6 MMOL/L — SIGNIFICANT CHANGE UP (ref 5–17)
APTT BLD: 37.2 SEC — HIGH (ref 27.5–35.5)
AST SERPL-CCNC: 21 U/L — SIGNIFICANT CHANGE UP (ref 10–40)
BILIRUB SERPL-MCNC: 0.4 MG/DL — SIGNIFICANT CHANGE UP (ref 0.2–1.2)
BUN SERPL-MCNC: 4 MG/DL — LOW (ref 7–18)
CALCIUM SERPL-MCNC: 9.3 MG/DL — SIGNIFICANT CHANGE UP (ref 8.4–10.5)
CHLORIDE SERPL-SCNC: 105 MMOL/L — SIGNIFICANT CHANGE UP (ref 96–108)
CO2 SERPL-SCNC: 28 MMOL/L — SIGNIFICANT CHANGE UP (ref 22–31)
CREAT SERPL-MCNC: 1.06 MG/DL — SIGNIFICANT CHANGE UP (ref 0.5–1.3)
GLUCOSE SERPL-MCNC: 110 MG/DL — HIGH (ref 70–99)
HCT VFR BLD CALC: 36.8 % — LOW (ref 39–50)
HGB BLD-MCNC: 11.7 G/DL — LOW (ref 13–17)
INR BLD: 1.53 RATIO — HIGH (ref 0.88–1.16)
MCHC RBC-ENTMCNC: 30.3 PG — SIGNIFICANT CHANGE UP (ref 27–34)
MCHC RBC-ENTMCNC: 31.8 GM/DL — LOW (ref 32–36)
MCV RBC AUTO: 95.3 FL — SIGNIFICANT CHANGE UP (ref 80–100)
NRBC # BLD: 0 /100 WBCS — SIGNIFICANT CHANGE UP (ref 0–0)
PLATELET # BLD AUTO: 400 K/UL — SIGNIFICANT CHANGE UP (ref 150–400)
POTASSIUM SERPL-MCNC: 3.8 MMOL/L — SIGNIFICANT CHANGE UP (ref 3.5–5.3)
POTASSIUM SERPL-SCNC: 3.8 MMOL/L — SIGNIFICANT CHANGE UP (ref 3.5–5.3)
PROT SERPL-MCNC: 6.7 G/DL — SIGNIFICANT CHANGE UP (ref 6–8.3)
PROTHROM AB SERPL-ACNC: 17.8 SEC — HIGH (ref 10.6–13.6)
RBC # BLD: 3.86 M/UL — LOW (ref 4.2–5.8)
RBC # FLD: 12.4 % — SIGNIFICANT CHANGE UP (ref 10.3–14.5)
SODIUM SERPL-SCNC: 139 MMOL/L — SIGNIFICANT CHANGE UP (ref 135–145)
WBC # BLD: 11.31 K/UL — HIGH (ref 3.8–10.5)
WBC # FLD AUTO: 11.31 K/UL — HIGH (ref 3.8–10.5)

## 2021-04-23 PROCEDURE — ZZZZZ: CPT

## 2021-04-23 PROCEDURE — 88112 CYTOPATH CELL ENHANCE TECH: CPT | Mod: 26

## 2021-04-23 PROCEDURE — 99232 SBSQ HOSP IP/OBS MODERATE 35: CPT

## 2021-04-23 PROCEDURE — 77012 CT SCAN FOR NEEDLE BIOPSY: CPT | Mod: 26

## 2021-04-23 PROCEDURE — 88305 TISSUE EXAM BY PATHOLOGIST: CPT | Mod: 26

## 2021-04-23 PROCEDURE — 10160 PNXR ASPIR ABSC HMTMA BULLA: CPT

## 2021-04-23 RX ORDER — HYDROMORPHONE HYDROCHLORIDE 2 MG/ML
0.5 INJECTION INTRAMUSCULAR; INTRAVENOUS; SUBCUTANEOUS ONCE
Refills: 0 | Status: DISCONTINUED | OUTPATIENT
Start: 2021-04-23 | End: 2021-04-23

## 2021-04-23 RX ORDER — ACETAMINOPHEN 500 MG
1000 TABLET ORAL ONCE
Refills: 0 | Status: COMPLETED | OUTPATIENT
Start: 2021-04-23 | End: 2021-04-23

## 2021-04-23 RX ADMIN — Medication 2.5 MILLIGRAM(S): at 12:01

## 2021-04-23 RX ADMIN — Medication 400 MILLIGRAM(S): at 16:22

## 2021-04-23 RX ADMIN — MORPHINE SULFATE 2 MILLIGRAM(S): 50 CAPSULE, EXTENDED RELEASE ORAL at 22:23

## 2021-04-23 RX ADMIN — PIPERACILLIN AND TAZOBACTAM 25 GRAM(S): 4; .5 INJECTION, POWDER, LYOPHILIZED, FOR SOLUTION INTRAVENOUS at 08:15

## 2021-04-23 RX ADMIN — Medication 2.5 MILLIGRAM(S): at 05:29

## 2021-04-23 RX ADMIN — PIPERACILLIN AND TAZOBACTAM 25 GRAM(S): 4; .5 INJECTION, POWDER, LYOPHILIZED, FOR SOLUTION INTRAVENOUS at 17:10

## 2021-04-23 RX ADMIN — HYDROMORPHONE HYDROCHLORIDE 0.5 MILLIGRAM(S): 2 INJECTION INTRAMUSCULAR; INTRAVENOUS; SUBCUTANEOUS at 16:20

## 2021-04-23 RX ADMIN — Medication 2.5 MILLIGRAM(S): at 17:10

## 2021-04-23 RX ADMIN — HYDROMORPHONE HYDROCHLORIDE 0.5 MILLIGRAM(S): 2 INJECTION INTRAMUSCULAR; INTRAVENOUS; SUBCUTANEOUS at 17:09

## 2021-04-23 RX ADMIN — Medication 1000 MILLIGRAM(S): at 17:09

## 2021-04-23 RX ADMIN — MORPHINE SULFATE 2 MILLIGRAM(S): 50 CAPSULE, EXTENDED RELEASE ORAL at 22:53

## 2021-04-23 RX ADMIN — PIPERACILLIN AND TAZOBACTAM 25 GRAM(S): 4; .5 INJECTION, POWDER, LYOPHILIZED, FOR SOLUTION INTRAVENOUS at 00:01

## 2021-04-23 NOTE — PROGRESS NOTE ADULT - SUBJECTIVE AND OBJECTIVE BOX
INTERVAL HPI/OVERNIGHT EVENTS:    Pt seen and examined at bedside. Admits to RUQ discomfort, denies nausea or vomiting, no fever, chills, SOB or CP.   Pt is NPO.     Vital Signs Last 24 Hrs  T(C): 36.7 (23 Apr 2021 05:05), Max: 36.9 (22 Apr 2021 21:07)  T(F): 98 (23 Apr 2021 05:05), Max: 98.5 (22 Apr 2021 21:07)  HR: 73 (23 Apr 2021 05:05) (67 - 73)  BP: 148/69 (23 Apr 2021 05:05) (140/64 - 167/69)  BP(mean): --  RR: 18 (23 Apr 2021 05:05) (16 - 18)  SpO2: 95% (23 Apr 2021 05:05) (95% - 100%)  I&O's Detail    22 Apr 2021 07:01  -  23 Apr 2021 07:00  --------------------------------------------------------  IN:    dextrose 5% + sodium chloride 0.45% w/ Additives: 990 mL    IV PiggyBack: 150 mL  Total IN: 1140 mL    OUT:    Voided (mL): 850 mL  Total OUT: 850 mL    Total NET: 290 mL    piperacillin/tazobactam IVPB.. 3.375 Gram(s) IV Intermittent every 8 hours      Physical Exam  General: AAOx3, No acute distress  Skin: No jaundice, no icterus  Abdomen: soft,  nondistended, RUQ TTP, no rebound tenderness, no guarding, no palpable masses  Extremities: non edematous, no calf pain bilaterally        Labs:                        11.7   11.31 )-----------( 400      ( 23 Apr 2021 05:44 )             36.8     04-23    139  |  105  |  4<L>  ----------------------------<  110<H>  3.8   |  28  |  1.06    Ca    9.3      23 Apr 2021 05:44    TPro  6.7  /  Alb  2.7<L>  /  TBili  0.4  /  DBili  x   /  AST  21  /  ALT  28  /  AlkPhos  94  04-23    PT/INR - ( 23 Apr 2021 05:44 )   PT: 17.8 sec;   INR: 1.53 ratio         PTT - ( 23 Apr 2021 05:44 )  PTT:37.2 sec

## 2021-04-23 NOTE — PROGRESS NOTE ADULT - ASSESSMENT
71M with cholecystitis with perforation and possible abscess vs collection     -F/u IR for drainage of collection   -NPO  -IVF   -Pain medication PRN   -OOB/Ambulate   -DVT ppx

## 2021-04-23 NOTE — PROGRESS NOTE ADULT - ASSESSMENT
70 yo M admitted for perforated cholecystitis with abscesses on day 4 of 7 of antibiotics. Patient is NPO since midnight, slept comfortably last night, not able to ambulate because of the pain. Patient denies N/V/flatus/BM, denies SOB and chest pain. Repeat CT abdomen from yesterday shows increased inflammatory changes from prior CT in RUQ. Inferior fluid collection slightly enlarged since prior CT, normal gallbladder still not visualized.    Plan  1. IR drainage today   2. Continue IVF and IV abx  3. pain control prn

## 2021-04-23 NOTE — PROGRESS NOTE ADULT - ASSESSMENT
Acute perforated cholecystitis   Gallbladder abscess  Leukocytosis   ·	cont Zosyn 3.375 gms iv q8 hrs  D3  ·	going for repeat CT A/P today  ·	IR intervention depends on CT results          Acute perforated cholecystitis   Gallbladder abscess  Leukocytosis     Plan: Continue Zosyn 3.375 gms iv q8 hrs   IR drainage today         Acute perforated cholecystitis   Gallbladder abscess  Leukocytosis     Plan: Continue Zosyn 3.375 gms iv q8 hrs   IR drainage today    I agree with above

## 2021-04-23 NOTE — PRE PROCEDURE NOTE - PRE PROCEDURE EVALUATION
Interventional Radiology Pre-Procedure Note    Diagnosis/Indication: Patient is a 71y old  Male with abdominal collection. Aspiration of the collection was requested.    PAST MEDICAL & SURGICAL HISTORY:  Hypertension  HLD (hyperlipidemia)    Allergies: No Known Allergies    LABS:  CBC Full  -  ( 23 Apr 2021 05:44 )  WBC Count : 11.31 K/uL  RBC Count : 3.86 M/uL  Hemoglobin : 11.7 g/dL  Hematocrit : 36.8 %  Platelet Count - Automated : 400 K/uL  Mean Cell Volume : 95.3 fl  Mean Cell Hemoglobin : 30.3 pg  Mean Cell Hemoglobin Concentration : 31.8 gm/dL    04-23    139  |  105  |  4<L>  ----------------------------<  110<H>  3.8   |  28  |  1.06    Ca    9.3      23 Apr 2021 05:44    TPro  6.7  /  Alb  2.7<L>  /  TBili  0.4  /  DBili  x   /  AST  21  /  ALT  28  /  AlkPhos  94  04-23    PT/INR - ( 23 Apr 2021 05:44 )   PT: 17.8 sec;   INR: 1.53 ratio       PTT - ( 23 Apr 2021 05:44 )  PTT:37.2 sec    Procedure/ risks/ benefits/ alternatives were discussed with the patient using the telephone  service. The patient verbalizes understanding, and witnessed informed consent was obtained.

## 2021-04-23 NOTE — PROGRESS NOTE ADULT - SUBJECTIVE AND OBJECTIVE BOX
INTERVAL HPI/OVERNIGHT EVENTS:  Pt resting comfortably. Still complaining of RUQ abdominal pain that has improved since yesterday. NPO since admit date 4/19. Denies N/V. Denies flatus/BM. Afebrile and non-tachycardic. No acute overnight events.     MEDICATIONS  (STANDING):  dextrose 5% + sodium chloride 0.45% with potassium chloride 20 mEq/L 1000 milliLiter(s) (110 mL/Hr) IV Continuous <Continuous>  lactated ringers. 500 milliLiter(s) (999 mL/Hr) IV Continuous <Continuous>  metoprolol tartrate Injectable 2.5 milliGRAM(s) IV Push every 6 hours  piperacillin/tazobactam IVPB.. 3.375 Gram(s) IV Intermittent every 8 hours    MEDICATIONS  (PRN):  morphine  - Injectable 2 milliGRAM(s) IV Push every 4 hours PRN Severe Pain (7 - 10)  ondansetron Injectable 4 milliGRAM(s) IV Push every 6 hours PRN Nausea      Vital Signs Last 24 Hrs  T(C): 36.7 (23 Apr 2021 05:05), Max: 36.9 (22 Apr 2021 21:07)  T(F): 98 (23 Apr 2021 05:05), Max: 98.5 (22 Apr 2021 21:07)  HR: 73 (23 Apr 2021 05:05) (67 - 73)  BP: 148/69 (23 Apr 2021 05:05) (140/64 - 167/69)  BP(mean): --  RR: 18 (23 Apr 2021 05:05) (16 - 18)  SpO2: 95% (23 Apr 2021 05:05) (95% - 100%)    Physical:  General: A&Ox3. NAD.  Abdomen: Soft nondistended, tender to palpation in RUQ.  Ext: no calf pain    I&O's Detail    22 Apr 2021 07:01  -  23 Apr 2021 07:00  --------------------------------------------------------  IN:    dextrose 5% + sodium chloride 0.45% w/ Additives: 990 mL    IV PiggyBack: 150 mL  Total IN: 1140 mL    OUT:    Voided (mL): 850 mL  Total OUT: 850 mL    Total NET: 290 mL          LABS:                        11.7   11.31 )-----------( 400      ( 23 Apr 2021 05:44 )             36.8             04-23    139  |  105  |  4<L>  ----------------------------<  110<H>  3.8   |  28  |  1.06    Ca    9.3      23 Apr 2021 05:44    TPro  6.7  /  Alb  2.7<L>  /  TBili  0.4  /  DBili  x   /  AST  21  /  ALT  28  /  AlkPhos  94  04-23      71y.o. Male

## 2021-04-23 NOTE — PROGRESS NOTE ADULT - SUBJECTIVE AND OBJECTIVE BOX
71y Male is under our care for     REVIEW OF SYSTEMS:  [  ] Not able to elicit  General:	  Chest:	  GI:	  :  Skin:	  Musculoskeletal:	  Neuro:	    MEDS:  piperacillin/tazobactam IVPB.. 3.375 Gram(s) IV Intermittent every 8 hours    ALLERGIES: Allergies    No Known Allergies    Intolerances        VITALS:  Vital Signs Last 24 Hrs  T(C): 36.7 (23 Apr 2021 05:05), Max: 36.9 (22 Apr 2021 21:07)  T(F): 98 (23 Apr 2021 05:05), Max: 98.5 (22 Apr 2021 21:07)  HR: 73 (23 Apr 2021 05:05) (67 - 73)  BP: 148/69 (23 Apr 2021 05:05) (140/64 - 167/69)  BP(mean): --  RR: 18 (23 Apr 2021 05:05) (16 - 18)  SpO2: 95% (23 Apr 2021 05:05) (95% - 100%)      PHYSICAL EXAM:  HEENT:  Neck:  Respiratory:  Cardiovascular:  Gastrointestinal:  Extremities:  Skin:  Ortho:  Neuro:    LABS/DIAGNOSTIC TESTS:                        11.7   11.31 )-----------( 400      ( 23 Apr 2021 05:44 )             36.8     WBC Count: 11.31 K/uL (04-23 @ 05:44)  WBC Count: 10.18 K/uL (04-22 @ 06:40)  WBC Count: 10.30 K/uL (04-21 @ 06:35)  WBC Count: 10.83 K/uL (04-20 @ 12:31)  WBC Count: 14.39 K/uL (04-19 @ 20:24)    04-23    139  |  105  |  4<L>  ----------------------------<  110<H>  3.8   |  28  |  1.06    Ca    9.3      23 Apr 2021 05:44    TPro  6.7  /  Alb  2.7<L>  /  TBili  0.4  /  DBili  x   /  AST  21  /  ALT  28  /  AlkPhos  94  04-23      CULTURES:   .Urine Clean Catch (Midstream)  04-20 @ 03:18   No growth  --  --        RADIOLOGY:   < from: CT Abdomen and Pelvis w/ Oral Cont and w/ IV Cont (04.22.21 @ 13:17) >    EXAM:  CT ABDOMEN AND PELVIS OC IC                            PROCEDURE DATE:  04/22/2021          INTERPRETATION:  CLINICAL INFORMATION: 71 years  Male with perforated cholecystitis.    COMPARISON: None.    CONTRAST/COMPLICATIONS:  IV Contrast: Omnipaque 350  90 cc administered   10 cc discarded  Oral Contrast: Omnipaque 300  Complications: None reported at time of study completion    PROCEDURE:  CT of the Abdomen and Pelvis was performed.  Sagittal and coronal reformats were performed.    FINDINGS:  LOWER CHEST: Developing small right pleural effusion.    LIVER: Mild intrahepatic biliary duct distention unchanged.  BILE DUCTS: Normal caliber common duct.  GALLBLADDER: Subcentimeter enhancing cystic structure reidentified in the gallbladder fossa, either contracted gallbladder or gallbladder remnant.  SPLEEN: Within normal limits.  PANCREAS: Within normal limits.  ADRENALS: Within normal limits.  KIDNEYS/URETERS: Stable 2.2 cm exophytic hyperdense lesion projecting off the left renal midpole. No hydroureteronephrosis or calculi. Symmetrical nephrograms. Right upper pole cortical scarring.    BLADDER: Within normal limits.  REPRODUCTIVE ORGANS: Mildly enlarged prostate measuring 5.3 x 4.3 cm.    BOWEL: No bowel obstruction. Appendixis not visualized. No evidence of inflammation in the pericecal region.  PERITONEUM: Inflammatory changes reidentified abutting the serosal surface of the hepatic flexure. Nodular soft tissue again extends from the colon to the adjacent gastric antrum and serosal surface of the left hepatic lobe. Inflammatory changes about the hepatic flexure have increased slightly. The collection adjacent to the hepatic flexure measures 4.5 x 3.0 x 2.6 cm, previously 3.0 x 2.2 x 2.0 cm. The superior collection measures 3.0 x 2.3 x 1.6 cm, previously 4.0 x 2.8 x 1.8 cm.  VESSELS: Atherosclerotic changes.  RETROPERITONEUM/LYMPH NODES: No lymphadenopathy.  ABDOMINAL WALL: Within normal limits.  BONES: Degenerative changes.    IMPRESSION:  Right upper quadrant nodular inflammatory changes slightly increased from prior. The inferior collection has enlarged slightly in the superior collection has decreased slightly.    Again, a normal gallbladder is not visualized. Consider cholecystectomy versus contracted gallbladder.    Again, findings may represent perforated cholecystitis, infectious process involving the hepatic flexure or malignancy.    Hyperdense left renal lesion. Recommend ultrasound to determine cystic versus solid nature.              AVTAR WOO MD; Attending Radiologist  This document has been electronically signed. Apr 22 2021  1:53PM    < end of copied text >   71y Male is under our care for acute perforated cholecystitis with gallbladder abscess and leukocytosis.  Patient was seen laying comfortably in bed with no acute distress.  Patients repeat CT scan yesterday showed a slightly enlarged inferior collection but the superior collection has slightly decreased in size.  Patient is scheduled for drainage in the IR today.  He complains of mild tenderness of the RLQ, remains afebrile and WBC count has slightly increased.    REVIEW OF SYSTEMS:  [  ] Not able to elicit  General: no fevers no malaise  Chest: no cough no sob  GI: no nvd, RLQ pain +  : no urinary sxs   Skin: no rashes  Musculoskeletal: no trauma no LBP  Neuro: no ha's no dizziness     MEDS:  piperacillin/tazobactam IVPB.. 3.375 Gram(s) IV Intermittent every 8 hours    ALLERGIES: Allergies    No Known Allergies    Intolerances        VITALS:  Vital Signs Last 24 Hrs  T(C): 36.7 (23 Apr 2021 05:05), Max: 36.9 (22 Apr 2021 21:07)  T(F): 98 (23 Apr 2021 05:05), Max: 98.5 (22 Apr 2021 21:07)  HR: 73 (23 Apr 2021 05:05) (67 - 73)  BP: 148/69 (23 Apr 2021 05:05) (140/64 - 167/69)  BP(mean): --  RR: 18 (23 Apr 2021 05:05) (16 - 18)  SpO2: 95% (23 Apr 2021 05:05) (95% - 100%)      PHYSICAL EXAM:  HEENT: n/a  Neck: supple no LN's   Respiratory: lungs clear no rales  Cardiovascular: S1 S2 reg no murmurs  Gastrointestinal: +BS with soft, nondistended abdomen; mild RLQ tenderness +   Extremities: no edema  Skin: no rashes  Ortho: n/a  Neuro: AAO x 4      LABS/DIAGNOSTIC TESTS:                        11.7   11.31 )-----------( 400      ( 23 Apr 2021 05:44 )             36.8     WBC Count: 11.31 K/uL (04-23 @ 05:44)  WBC Count: 10.18 K/uL (04-22 @ 06:40)  WBC Count: 10.30 K/uL (04-21 @ 06:35)  WBC Count: 10.83 K/uL (04-20 @ 12:31)  WBC Count: 14.39 K/uL (04-19 @ 20:24)    04-23    139  |  105  |  4<L>  ----------------------------<  110<H>  3.8   |  28  |  1.06    Ca    9.3      23 Apr 2021 05:44    TPro  6.7  /  Alb  2.7<L>  /  TBili  0.4  /  DBili  x   /  AST  21  /  ALT  28  /  AlkPhos  94  04-23      CULTURES:   .Urine Clean Catch (Midstream)  04-20 @ 03:18   No growth  --  --        RADIOLOGY:   < from: CT Abdomen and Pelvis w/ Oral Cont and w/ IV Cont (04.22.21 @ 13:17) >    EXAM:  CT ABDOMEN AND PELVIS OC IC                            PROCEDURE DATE:  04/22/2021          INTERPRETATION:  CLINICAL INFORMATION: 71 years  Male with perforated cholecystitis.    COMPARISON: None.    CONTRAST/COMPLICATIONS:  IV Contrast: Omnipaque 350  90 cc administered   10 cc discarded  Oral Contrast: Omnipaque 300  Complications: None reported at time of study completion    PROCEDURE:  CT of the Abdomen and Pelvis was performed.  Sagittal and coronal reformats were performed.    FINDINGS:  LOWER CHEST: Developing small right pleural effusion.    LIVER: Mild intrahepatic biliary duct distention unchanged.  BILE DUCTS: Normal caliber common duct.  GALLBLADDER: Subcentimeter enhancing cystic structure reidentified in the gallbladder fossa, either contracted gallbladder or gallbladder remnant.  SPLEEN: Within normal limits.  PANCREAS: Within normal limits.  ADRENALS: Within normal limits.  KIDNEYS/URETERS: Stable 2.2 cm exophytic hyperdense lesion projecting off the left renal midpole. No hydroureteronephrosis or calculi. Symmetrical nephrograms. Right upper pole cortical scarring.    BLADDER: Within normal limits.  REPRODUCTIVE ORGANS: Mildly enlarged prostate measuring 5.3 x 4.3 cm.    BOWEL: No bowel obstruction. Appendixis not visualized. No evidence of inflammation in the pericecal region.  PERITONEUM: Inflammatory changes reidentified abutting the serosal surface of the hepatic flexure. Nodular soft tissue again extends from the colon to the adjacent gastric antrum and serosal surface of the left hepatic lobe. Inflammatory changes about the hepatic flexure have increased slightly. The collection adjacent to the hepatic flexure measures 4.5 x 3.0 x 2.6 cm, previously 3.0 x 2.2 x 2.0 cm. The superior collection measures 3.0 x 2.3 x 1.6 cm, previously 4.0 x 2.8 x 1.8 cm.  VESSELS: Atherosclerotic changes.  RETROPERITONEUM/LYMPH NODES: No lymphadenopathy.  ABDOMINAL WALL: Within normal limits.  BONES: Degenerative changes.    IMPRESSION:  Right upper quadrant nodular inflammatory changes slightly increased from prior. The inferior collection has enlarged slightly in the superior collection has decreased slightly.    Again, a normal gallbladder is not visualized. Consider cholecystectomy versus contracted gallbladder.    Again, findings may represent perforated cholecystitis, infectious process involving the hepatic flexure or malignancy.    Hyperdense left renal lesion. Recommend ultrasound to determine cystic versus solid nature.              AVTAR WOO MD; Attending Radiologist  This document has been electronically signed. Apr 22 2021  1:53PM    < end of copied text >

## 2021-04-24 LAB
ALBUMIN SERPL ELPH-MCNC: 2.3 G/DL — LOW (ref 3.5–5)
ALP SERPL-CCNC: 81 U/L — SIGNIFICANT CHANGE UP (ref 40–120)
ALT FLD-CCNC: 26 U/L DA — SIGNIFICANT CHANGE UP (ref 10–60)
ANION GAP SERPL CALC-SCNC: 7 MMOL/L — SIGNIFICANT CHANGE UP (ref 5–17)
AST SERPL-CCNC: 18 U/L — SIGNIFICANT CHANGE UP (ref 10–40)
BILIRUB SERPL-MCNC: 0.4 MG/DL — SIGNIFICANT CHANGE UP (ref 0.2–1.2)
BUN SERPL-MCNC: 4 MG/DL — LOW (ref 7–18)
CALCIUM SERPL-MCNC: 8.7 MG/DL — SIGNIFICANT CHANGE UP (ref 8.4–10.5)
CHLORIDE SERPL-SCNC: 105 MMOL/L — SIGNIFICANT CHANGE UP (ref 96–108)
CO2 SERPL-SCNC: 27 MMOL/L — SIGNIFICANT CHANGE UP (ref 22–31)
CREAT SERPL-MCNC: 1.04 MG/DL — SIGNIFICANT CHANGE UP (ref 0.5–1.3)
GLUCOSE SERPL-MCNC: 112 MG/DL — HIGH (ref 70–99)
HCT VFR BLD CALC: 34.8 % — LOW (ref 39–50)
HGB BLD-MCNC: 11.1 G/DL — LOW (ref 13–17)
MCHC RBC-ENTMCNC: 30.7 PG — SIGNIFICANT CHANGE UP (ref 27–34)
MCHC RBC-ENTMCNC: 31.9 GM/DL — LOW (ref 32–36)
MCV RBC AUTO: 96.4 FL — SIGNIFICANT CHANGE UP (ref 80–100)
NRBC # BLD: 0 /100 WBCS — SIGNIFICANT CHANGE UP (ref 0–0)
PLATELET # BLD AUTO: 376 K/UL — SIGNIFICANT CHANGE UP (ref 150–400)
POTASSIUM SERPL-MCNC: 4.5 MMOL/L — SIGNIFICANT CHANGE UP (ref 3.5–5.3)
POTASSIUM SERPL-SCNC: 4.5 MMOL/L — SIGNIFICANT CHANGE UP (ref 3.5–5.3)
PROT SERPL-MCNC: 6.4 G/DL — SIGNIFICANT CHANGE UP (ref 6–8.3)
RBC # BLD: 3.61 M/UL — LOW (ref 4.2–5.8)
RBC # FLD: 12.4 % — SIGNIFICANT CHANGE UP (ref 10.3–14.5)
SODIUM SERPL-SCNC: 139 MMOL/L — SIGNIFICANT CHANGE UP (ref 135–145)
WBC # BLD: 9.06 K/UL — SIGNIFICANT CHANGE UP (ref 3.8–10.5)
WBC # FLD AUTO: 9.06 K/UL — SIGNIFICANT CHANGE UP (ref 3.8–10.5)

## 2021-04-24 PROCEDURE — 99232 SBSQ HOSP IP/OBS MODERATE 35: CPT

## 2021-04-24 RX ADMIN — Medication 2.5 MILLIGRAM(S): at 05:21

## 2021-04-24 RX ADMIN — PIPERACILLIN AND TAZOBACTAM 25 GRAM(S): 4; .5 INJECTION, POWDER, LYOPHILIZED, FOR SOLUTION INTRAVENOUS at 09:03

## 2021-04-24 RX ADMIN — Medication 2.5 MILLIGRAM(S): at 13:45

## 2021-04-24 RX ADMIN — PIPERACILLIN AND TAZOBACTAM 25 GRAM(S): 4; .5 INJECTION, POWDER, LYOPHILIZED, FOR SOLUTION INTRAVENOUS at 00:05

## 2021-04-24 RX ADMIN — DEXTROSE MONOHYDRATE, SODIUM CHLORIDE, AND POTASSIUM CHLORIDE 110 MILLILITER(S): 50; .745; 4.5 INJECTION, SOLUTION INTRAVENOUS at 23:33

## 2021-04-24 RX ADMIN — Medication 2.5 MILLIGRAM(S): at 18:50

## 2021-04-24 RX ADMIN — MORPHINE SULFATE 2 MILLIGRAM(S): 50 CAPSULE, EXTENDED RELEASE ORAL at 14:00

## 2021-04-24 RX ADMIN — MORPHINE SULFATE 2 MILLIGRAM(S): 50 CAPSULE, EXTENDED RELEASE ORAL at 23:51

## 2021-04-24 RX ADMIN — Medication 2.5 MILLIGRAM(S): at 23:33

## 2021-04-24 RX ADMIN — PIPERACILLIN AND TAZOBACTAM 25 GRAM(S): 4; .5 INJECTION, POWDER, LYOPHILIZED, FOR SOLUTION INTRAVENOUS at 17:44

## 2021-04-24 RX ADMIN — PIPERACILLIN AND TAZOBACTAM 25 GRAM(S): 4; .5 INJECTION, POWDER, LYOPHILIZED, FOR SOLUTION INTRAVENOUS at 23:51

## 2021-04-24 RX ADMIN — MORPHINE SULFATE 2 MILLIGRAM(S): 50 CAPSULE, EXTENDED RELEASE ORAL at 13:45

## 2021-04-24 NOTE — PROGRESS NOTE ADULT - SUBJECTIVE AND OBJECTIVE BOX
Patient seen and examined at bedside   #[id number] Yohan bowman   Admits to pain at site of IR drainage   Overall pain improved  Denies nausea/ vomiting.   Tolerating diet.    Vital Signs Last 24 Hrs  T(F): 98.1 (04-24-21 @ 06:11), Max: 98.2 (04-24-21 @ 00:20)  HR: 71 (04-24-21 @ 06:11)  BP: 160/64 (04-24-21 @ 06:11)  RR: 18 (04-24-21 @ 06:11)  SpO2: 97% (04-24-21 @ 06:11)    GENERAL: Alert, NAD  CHEST/LUNG: respirations nonlabored  ABDOMEN: soft, Nondistended, tender at site of IR drainage, dressing clean and dry   EXTREMITIES:  no calf tenderness, No edema    I&O's Detail    LABS:                        11.1   9.06  )-----------( 376      ( 24 Apr 2021 07:08 )             34.8     04-24    139  |  105  |  4<L>  ----------------------------<  112<H>  4.5   |  27  |  1.04    Ca    8.7      24 Apr 2021 07:08    TPro  6.4  /  Alb  2.3<L>  /  TBili  0.4  /  DBili  x   /  AST  18  /  ALT  26  /  AlkPhos  81  04-24    PT/INR - ( 23 Apr 2021 05:44 )   PT: 17.8 sec;   INR: 1.53 ratio       PTT - ( 23 Apr 2021 05:44 )  PTT:37.2 sec

## 2021-04-24 NOTE — PROGRESS NOTE ADULT - ASSESSMENT
Acute perforated cholecystitis   Gallbladder abscess  Leukocytosis - normalized    Plan: Continue Zosyn 3.375 gms iv q8 hrs  s/p IR drainage of 1 abscess  will likely need a PICC line for IV abxs at home.

## 2021-04-24 NOTE — PROGRESS NOTE ADULT - ASSESSMENT
71 year old male s/p IR aspiration of abscess 4/23 due to perforated cholecystitis    - continue diet as tolerated  - continue IV antibiotics  - await culture results from IR aspiration   - will adjust antibiotics once cultures back  - discharge planning after cultures resulted  - discussed with patient and all questions answered 71 year old male s/p IR aspiration of abscess 4/23 due to perforated cholecystitis    - continue diet as tolerated  - continue IV antibiotics  - await culture results from IR aspiration   - will adjust antibiotics once cultures back  - discharge planning after cultures resulted  - discussed with patient and all questions answered      Pt seen in am  stable, no complaints

## 2021-04-24 NOTE — PROGRESS NOTE ADULT - SUBJECTIVE AND OBJECTIVE BOX
71y Male    Meds:  piperacillin/tazobactam IVPB.. 3.375 Gram(s) IV Intermittent every 8 hours    Allergies    No Known Allergies    Intolerances        VITALS:  Vital Signs Last 24 Hrs  T(C): 36.8 (24 Apr 2021 14:00), Max: 36.8 (24 Apr 2021 00:20)  T(F): 98.2 (24 Apr 2021 14:00), Max: 98.3 (24 Apr 2021 13:45)  HR: 60 (24 Apr 2021 14:00) (60 - 75)  BP: 149/73 (24 Apr 2021 14:00) (107/57 - 166/73)  BP(mean): 97 (23 Apr 2021 16:45) (97 - 97)  RR: 17 (24 Apr 2021 14:00) (16 - 18)  SpO2: 95% (24 Apr 2021 14:00) (95% - 99%)    LABS/DIAGNOSTIC TESTS:                          11.1   9.06  )-----------( 376      ( 24 Apr 2021 07:08 )             34.8         04-24    139  |  105  |  4<L>  ----------------------------<  112<H>  4.5   |  27  |  1.04    Ca    8.7      24 Apr 2021 07:08    TPro  6.4  /  Alb  2.3<L>  /  TBili  0.4  /  DBili  x   /  AST  18  /  ALT  26  /  AlkPhos  81  04-24      LIVER FUNCTIONS - ( 24 Apr 2021 07:08 )  Alb: 2.3 g/dL / Pro: 6.4 g/dL / ALK PHOS: 81 U/L / ALT: 26 U/L DA / AST: 18 U/L / GGT: x             CULTURES: .Urine Clean Catch (Midstream)  04-20 @ 03:18   No growth  --  --            RADIOLOGY:      ROS:  [  ] UNABLE TO ELICIT 71y Male who is looking much better, he has less abdominal  pain post IR drainage of one of his abscesses, a total of 8 cc of pus was drained and no drain left in. He has no nausea, vomiting or diarrhea, no fevers or chills, he is sitting up and eating and has an appetite. Awaiting culture results of abscess.    Meds:  piperacillin/tazobactam IVPB.. 3.375 Gram(s) IV Intermittent every 8 hours    Allergies    No Known Allergies    Intolerances        VITALS:  Vital Signs Last 24 Hrs  T(C): 36.8 (24 Apr 2021 14:00), Max: 36.8 (24 Apr 2021 00:20)  T(F): 98.2 (24 Apr 2021 14:00), Max: 98.3 (24 Apr 2021 13:45)  HR: 60 (24 Apr 2021 14:00) (60 - 75)  BP: 149/73 (24 Apr 2021 14:00) (107/57 - 166/73)  BP(mean): 97 (23 Apr 2021 16:45) (97 - 97)  RR: 17 (24 Apr 2021 14:00) (16 - 18)  SpO2: 95% (24 Apr 2021 14:00) (95% - 99%)    LABS/DIAGNOSTIC TESTS:                          11.1   9.06  )-----------( 376      ( 24 Apr 2021 07:08 )             34.8         04-24    139  |  105  |  4<L>  ----------------------------<  112<H>  4.5   |  27  |  1.04    Ca    8.7      24 Apr 2021 07:08    TPro  6.4  /  Alb  2.3<L>  /  TBili  0.4  /  DBili  x   /  AST  18  /  ALT  26  /  AlkPhos  81  04-24      LIVER FUNCTIONS - ( 24 Apr 2021 07:08 )  Alb: 2.3 g/dL / Pro: 6.4 g/dL / ALK PHOS: 81 U/L / ALT: 26 U/L DA / AST: 18 U/L / GGT: x             CULTURES: .Urine Clean Catch (Midstream)  04-20 @ 03:18   No growth  --  --            RADIOLOGY:      ROS:  [  ] UNABLE TO ELICIT

## 2021-04-25 LAB
ALBUMIN SERPL ELPH-MCNC: 2.5 G/DL — LOW (ref 3.5–5)
ALP SERPL-CCNC: 79 U/L — SIGNIFICANT CHANGE UP (ref 40–120)
ALT FLD-CCNC: 27 U/L DA — SIGNIFICANT CHANGE UP (ref 10–60)
ANION GAP SERPL CALC-SCNC: 4 MMOL/L — LOW (ref 5–17)
AST SERPL-CCNC: 22 U/L — SIGNIFICANT CHANGE UP (ref 10–40)
BILIRUB SERPL-MCNC: 0.4 MG/DL — SIGNIFICANT CHANGE UP (ref 0.2–1.2)
BUN SERPL-MCNC: 5 MG/DL — LOW (ref 7–18)
CALCIUM SERPL-MCNC: 9 MG/DL — SIGNIFICANT CHANGE UP (ref 8.4–10.5)
CHLORIDE SERPL-SCNC: 107 MMOL/L — SIGNIFICANT CHANGE UP (ref 96–108)
CO2 SERPL-SCNC: 27 MMOL/L — SIGNIFICANT CHANGE UP (ref 22–31)
CREAT SERPL-MCNC: 1.23 MG/DL — SIGNIFICANT CHANGE UP (ref 0.5–1.3)
GLUCOSE SERPL-MCNC: 109 MG/DL — HIGH (ref 70–99)
HCT VFR BLD CALC: 36.2 % — LOW (ref 39–50)
HGB BLD-MCNC: 11.4 G/DL — LOW (ref 13–17)
MCHC RBC-ENTMCNC: 30.6 PG — SIGNIFICANT CHANGE UP (ref 27–34)
MCHC RBC-ENTMCNC: 31.5 GM/DL — LOW (ref 32–36)
MCV RBC AUTO: 97.3 FL — SIGNIFICANT CHANGE UP (ref 80–100)
NRBC # BLD: 0 /100 WBCS — SIGNIFICANT CHANGE UP (ref 0–0)
PLATELET # BLD AUTO: 344 K/UL — SIGNIFICANT CHANGE UP (ref 150–400)
POTASSIUM SERPL-MCNC: 4.3 MMOL/L — SIGNIFICANT CHANGE UP (ref 3.5–5.3)
POTASSIUM SERPL-SCNC: 4.3 MMOL/L — SIGNIFICANT CHANGE UP (ref 3.5–5.3)
PROT SERPL-MCNC: 6.6 G/DL — SIGNIFICANT CHANGE UP (ref 6–8.3)
RBC # BLD: 3.72 M/UL — LOW (ref 4.2–5.8)
RBC # FLD: 12.4 % — SIGNIFICANT CHANGE UP (ref 10.3–14.5)
SODIUM SERPL-SCNC: 138 MMOL/L — SIGNIFICANT CHANGE UP (ref 135–145)
WBC # BLD: 8.02 K/UL — SIGNIFICANT CHANGE UP (ref 3.8–10.5)
WBC # FLD AUTO: 8.02 K/UL — SIGNIFICANT CHANGE UP (ref 3.8–10.5)

## 2021-04-25 PROCEDURE — 99232 SBSQ HOSP IP/OBS MODERATE 35: CPT

## 2021-04-25 RX ORDER — METOPROLOL TARTRATE 50 MG
25 TABLET ORAL DAILY
Refills: 0 | Status: DISCONTINUED | OUTPATIENT
Start: 2021-04-25 | End: 2021-04-25

## 2021-04-25 RX ORDER — METOPROLOL TARTRATE 50 MG
25 TABLET ORAL DAILY
Refills: 0 | Status: DISCONTINUED | OUTPATIENT
Start: 2021-04-25 | End: 2021-04-27

## 2021-04-25 RX ADMIN — PIPERACILLIN AND TAZOBACTAM 25 GRAM(S): 4; .5 INJECTION, POWDER, LYOPHILIZED, FOR SOLUTION INTRAVENOUS at 07:45

## 2021-04-25 RX ADMIN — MORPHINE SULFATE 2 MILLIGRAM(S): 50 CAPSULE, EXTENDED RELEASE ORAL at 23:26

## 2021-04-25 RX ADMIN — PIPERACILLIN AND TAZOBACTAM 25 GRAM(S): 4; .5 INJECTION, POWDER, LYOPHILIZED, FOR SOLUTION INTRAVENOUS at 23:45

## 2021-04-25 RX ADMIN — MORPHINE SULFATE 2 MILLIGRAM(S): 50 CAPSULE, EXTENDED RELEASE ORAL at 23:00

## 2021-04-25 RX ADMIN — Medication 2.5 MILLIGRAM(S): at 05:14

## 2021-04-25 RX ADMIN — PIPERACILLIN AND TAZOBACTAM 25 GRAM(S): 4; .5 INJECTION, POWDER, LYOPHILIZED, FOR SOLUTION INTRAVENOUS at 16:02

## 2021-04-25 RX ADMIN — MORPHINE SULFATE 2 MILLIGRAM(S): 50 CAPSULE, EXTENDED RELEASE ORAL at 00:16

## 2021-04-25 RX ADMIN — Medication 25 MILLIGRAM(S): at 12:00

## 2021-04-25 NOTE — PHARMACOTHERAPY INTERVENTION NOTE - COMMENTS
Metoprolol tartrate 25 mg oral daily changed to Metoprolol succinate ER 25 mg oral daily with parameters

## 2021-04-25 NOTE — PROGRESS NOTE ADULT - ASSESSMENT
71 year old male s/p IR aspiration of abscess 4/23 due to perforated cholecystitis  -F/u cultures from IR aspiration  -IV Abx  -D/c planning when culture back, will adjust antibiotics accordingly  -Pain control PRN   -DVT ppx  -Will discuss with Dr. Carr 71 year old male s/p IR aspiration of abscess 4/23 due to perforated cholecystitis  -F/u cultures from IR aspiration  -IV Abx  -D/c planning when culture back, will adjust antibiotics accordingly  -Pain control PRN   -DVT ppx  -Will discuss with Dr. Carr    ADDENDUM    clinically stable

## 2021-04-25 NOTE — PROGRESS NOTE ADULT - SUBJECTIVE AND OBJECTIVE BOX
71y Male    Meds:  piperacillin/tazobactam IVPB.. 3.375 Gram(s) IV Intermittent every 8 hours    Allergies    No Known Allergies    Intolerances        VITALS:  Vital Signs Last 24 Hrs  T(C): 36.7 (25 Apr 2021 12:40), Max: 37 (24 Apr 2021 21:33)  T(F): 98.1 (25 Apr 2021 12:40), Max: 98.6 (24 Apr 2021 21:33)  HR: 61 (25 Apr 2021 12:40) (61 - 74)  BP: 145/66 (25 Apr 2021 12:40) (128/61 - 145/66)  BP(mean): 80 (24 Apr 2021 19:11) (80 - 80)  RR: 18 (25 Apr 2021 12:40) (18 - 18)  SpO2: 100% (25 Apr 2021 12:40) (95% - 100%)    LABS/DIAGNOSTIC TESTS:                          11.4   8.02  )-----------( 344      ( 25 Apr 2021 06:21 )             36.2         04-25    138  |  107  |  5<L>  ----------------------------<  109<H>  4.3   |  27  |  1.23    Ca    9.0      25 Apr 2021 06:21    TPro  6.6  /  Alb  2.5<L>  /  TBili  0.4  /  DBili  x   /  AST  22  /  ALT  27  /  AlkPhos  79  04-25      LIVER FUNCTIONS - ( 25 Apr 2021 06:21 )  Alb: 2.5 g/dL / Pro: 6.6 g/dL / ALK PHOS: 79 U/L / ALT: 27 U/L DA / AST: 22 U/L / GGT: x             CULTURES: .Urine Clean Catch (Midstream)  04-20 @ 03:18   No growth  --  --            RADIOLOGY:      ROS:  [  ] UNABLE TO ELICIT 71y Male who is doing well, he has mild RUQ pain only , he has no fevers , chills , no nausea or vomiting or diarrhea. He has no other complaints.   Though IR drained one abscess and ordered a culture it appears that no specimen was sent and so no abscess culture is testing.  Spoke with patient via his Granddaughter over the phone and explained to him that he will need a PICC line and IV abxs at home.    Meds:  piperacillin/tazobactam IVPB.. 3.375 Gram(s) IV Intermittent every 8 hours    Allergies    No Known Allergies    Intolerances        VITALS:  Vital Signs Last 24 Hrs  T(C): 36.7 (25 Apr 2021 12:40), Max: 37 (24 Apr 2021 21:33)  T(F): 98.1 (25 Apr 2021 12:40), Max: 98.6 (24 Apr 2021 21:33)  HR: 61 (25 Apr 2021 12:40) (61 - 74)  BP: 145/66 (25 Apr 2021 12:40) (128/61 - 145/66)  BP(mean): 80 (24 Apr 2021 19:11) (80 - 80)  RR: 18 (25 Apr 2021 12:40) (18 - 18)  SpO2: 100% (25 Apr 2021 12:40) (95% - 100%)    LABS/DIAGNOSTIC TESTS:                          11.4   8.02  )-----------( 344      ( 25 Apr 2021 06:21 )             36.2         04-25    138  |  107  |  5<L>  ----------------------------<  109<H>  4.3   |  27  |  1.23    Ca    9.0      25 Apr 2021 06:21    TPro  6.6  /  Alb  2.5<L>  /  TBili  0.4  /  DBili  x   /  AST  22  /  ALT  27  /  AlkPhos  79  04-25      LIVER FUNCTIONS - ( 25 Apr 2021 06:21 )  Alb: 2.5 g/dL / Pro: 6.6 g/dL / ALK PHOS: 79 U/L / ALT: 27 U/L DA / AST: 22 U/L / GGT: x             CULTURES: .Urine Clean Catch (Midstream)  04-20 @ 03:18   No growth  --  --            RADIOLOGY:      ROS:  [  ] UNABLE TO ELICIT

## 2021-04-25 NOTE — PROGRESS NOTE ADULT - SUBJECTIVE AND OBJECTIVE BOX
INTERVAL HPI/OVERNIGHT EVENTS:  Patient seen and examined at bedside.   Pt resting comfortably. No acute complaints, overall pain improved   #: [id number] Anjelica  Tolerating diet.   Denies N/V    MEDICATIONS  (STANDING):  dextrose 5% + sodium chloride 0.45% with potassium chloride 20 mEq/L 1000 milliLiter(s) (110 mL/Hr) IV Continuous <Continuous>  lactated ringers. 500 milliLiter(s) (999 mL/Hr) IV Continuous <Continuous>  metoprolol tartrate Injectable 2.5 milliGRAM(s) IV Push every 6 hours  piperacillin/tazobactam IVPB.. 3.375 Gram(s) IV Intermittent every 8 hours    MEDICATIONS  (PRN):  morphine  - Injectable 2 milliGRAM(s) IV Push every 4 hours PRN Severe Pain (7 - 10)  ondansetron Injectable 4 milliGRAM(s) IV Push every 6 hours PRN Nausea      Vital Signs Last 24 Hrs  T(C): 36.8 (25 Apr 2021 05:22), Max: 37 (24 Apr 2021 21:33)  T(F): 98.2 (25 Apr 2021 05:22), Max: 98.6 (24 Apr 2021 21:33)  HR: 64 (25 Apr 2021 05:22) (60 - 74)  BP: 130/72 (25 Apr 2021 05:22) (128/61 - 158/81)  BP(mean): 80 (24 Apr 2021 19:11) (80 - 80)  RR: 18 (25 Apr 2021 05:22) (16 - 18)  SpO2: 96% (25 Apr 2021 05:22) (95% - 96%)    Physical:  General: A&Ox3. NAD.  Respirations: Unlabored   Abdomen: Soft nondistended, nontender. Dressing C/D/I from IR aspiration site, appropriate site tenderness     I&O's Detail    24 Apr 2021 07:01  -  25 Apr 2021 07:00  --------------------------------------------------------  IN:    dextrose 5% + sodium chloride 0.45% w/ Additives: 1430 mL    IV PiggyBack: 100 mL  Total IN: 1530 mL    OUT:    Voided (mL): 300 mL  Total OUT: 300 mL    Total NET: 1230 mL          LABS:                        11.4   8.02  )-----------( 344      ( 25 Apr 2021 06:21 )             36.2             04-25    138  |  107  |  5<L>  ----------------------------<  109<H>  4.3   |  27  |  1.23    Ca    9.0      25 Apr 2021 06:21    TPro  6.6  /  Alb  2.5<L>  /  TBili  0.4  /  DBili  x   /  AST  22  /  ALT  27  /  AlkPhos  79  04-25

## 2021-04-25 NOTE — PROGRESS NOTE ADULT - ASSESSMENT
Acute perforated cholecystitis   Gallbladder abscess  Leukocytosis - normalized    Plan: Continue Zosyn 3.375 gms iv q8 hrs  s/p IR drainage of 1 abscess - no abscess culture was sent   needs a PICC line for IV abxs at home.  will need Invanz 1 gm iv q24 hrs x 3 weeks  repeat CT of abdomen in 2 weeks as outpatient.

## 2021-04-25 NOTE — PROGRESS NOTE ADULT - RS GEN PE MLT RESP DETAILS PC
breath sounds equal/good air movement/clear to auscultation bilaterally/no rales/no rhonchi/no wheezes
breath sounds equal/good air movement/clear to auscultation bilaterally/no rales/no rhonchi/no wheezes

## 2021-04-26 LAB
ALBUMIN SERPL ELPH-MCNC: 2.3 G/DL — LOW (ref 3.5–5)
ALP SERPL-CCNC: 72 U/L — SIGNIFICANT CHANGE UP (ref 40–120)
ALT FLD-CCNC: 28 U/L DA — SIGNIFICANT CHANGE UP (ref 10–60)
ANION GAP SERPL CALC-SCNC: 5 MMOL/L — SIGNIFICANT CHANGE UP (ref 5–17)
AST SERPL-CCNC: 22 U/L — SIGNIFICANT CHANGE UP (ref 10–40)
BILIRUB SERPL-MCNC: 0.4 MG/DL — SIGNIFICANT CHANGE UP (ref 0.2–1.2)
BUN SERPL-MCNC: 5 MG/DL — LOW (ref 7–18)
CALCIUM SERPL-MCNC: 8.5 MG/DL — SIGNIFICANT CHANGE UP (ref 8.4–10.5)
CHLORIDE SERPL-SCNC: 105 MMOL/L — SIGNIFICANT CHANGE UP (ref 96–108)
CO2 SERPL-SCNC: 28 MMOL/L — SIGNIFICANT CHANGE UP (ref 22–31)
CREAT SERPL-MCNC: 1.11 MG/DL — SIGNIFICANT CHANGE UP (ref 0.5–1.3)
GLUCOSE SERPL-MCNC: 106 MG/DL — HIGH (ref 70–99)
GRAM STN FLD: SIGNIFICANT CHANGE UP
HCT VFR BLD CALC: 34.9 % — LOW (ref 39–50)
HGB BLD-MCNC: 11 G/DL — LOW (ref 13–17)
MCHC RBC-ENTMCNC: 30.5 PG — SIGNIFICANT CHANGE UP (ref 27–34)
MCHC RBC-ENTMCNC: 31.5 GM/DL — LOW (ref 32–36)
MCV RBC AUTO: 96.7 FL — SIGNIFICANT CHANGE UP (ref 80–100)
NRBC # BLD: 0 /100 WBCS — SIGNIFICANT CHANGE UP (ref 0–0)
PLATELET # BLD AUTO: 373 K/UL — SIGNIFICANT CHANGE UP (ref 150–400)
POTASSIUM SERPL-MCNC: 4.3 MMOL/L — SIGNIFICANT CHANGE UP (ref 3.5–5.3)
POTASSIUM SERPL-SCNC: 4.3 MMOL/L — SIGNIFICANT CHANGE UP (ref 3.5–5.3)
PROT SERPL-MCNC: 6.3 G/DL — SIGNIFICANT CHANGE UP (ref 6–8.3)
RBC # BLD: 3.61 M/UL — LOW (ref 4.2–5.8)
RBC # FLD: 12.3 % — SIGNIFICANT CHANGE UP (ref 10.3–14.5)
SODIUM SERPL-SCNC: 138 MMOL/L — SIGNIFICANT CHANGE UP (ref 135–145)
SPECIMEN SOURCE: SIGNIFICANT CHANGE UP
WBC # BLD: 7.01 K/UL — SIGNIFICANT CHANGE UP (ref 3.8–10.5)
WBC # FLD AUTO: 7.01 K/UL — SIGNIFICANT CHANGE UP (ref 3.8–10.5)

## 2021-04-26 RX ORDER — PIPERACILLIN AND TAZOBACTAM 4; .5 G/20ML; G/20ML
3.38 INJECTION, POWDER, LYOPHILIZED, FOR SOLUTION INTRAVENOUS EVERY 8 HOURS
Refills: 0 | Status: DISCONTINUED | OUTPATIENT
Start: 2021-04-26 | End: 2021-04-27

## 2021-04-26 RX ADMIN — DEXTROSE MONOHYDRATE, SODIUM CHLORIDE, AND POTASSIUM CHLORIDE 110 MILLILITER(S): 50; .745; 4.5 INJECTION, SOLUTION INTRAVENOUS at 13:04

## 2021-04-26 RX ADMIN — PIPERACILLIN AND TAZOBACTAM 25 GRAM(S): 4; .5 INJECTION, POWDER, LYOPHILIZED, FOR SOLUTION INTRAVENOUS at 21:57

## 2021-04-26 RX ADMIN — Medication 25 MILLIGRAM(S): at 05:22

## 2021-04-26 RX ADMIN — MORPHINE SULFATE 2 MILLIGRAM(S): 50 CAPSULE, EXTENDED RELEASE ORAL at 21:58

## 2021-04-26 RX ADMIN — MORPHINE SULFATE 2 MILLIGRAM(S): 50 CAPSULE, EXTENDED RELEASE ORAL at 22:30

## 2021-04-26 RX ADMIN — DEXTROSE MONOHYDRATE, SODIUM CHLORIDE, AND POTASSIUM CHLORIDE 110 MILLILITER(S): 50; .745; 4.5 INJECTION, SOLUTION INTRAVENOUS at 21:57

## 2021-04-26 RX ADMIN — PIPERACILLIN AND TAZOBACTAM 25 GRAM(S): 4; .5 INJECTION, POWDER, LYOPHILIZED, FOR SOLUTION INTRAVENOUS at 15:47

## 2021-04-26 RX ADMIN — PIPERACILLIN AND TAZOBACTAM 25 GRAM(S): 4; .5 INJECTION, POWDER, LYOPHILIZED, FOR SOLUTION INTRAVENOUS at 08:00

## 2021-04-26 NOTE — PROGRESS NOTE ADULT - ASSESSMENT
Acute perforated cholecystitis   Gallbladder abscess  Leukocytosis - normalized    Plan: Continue Zosyn 3.375 gms iv q8 hrs  s/p IR drainage of 1 abscess  will likely need a PICC line for IV abxs at home.           Acute perforated cholecystitis   Gallbladder abscess  Leukocytosis - normalized    Plan: Continue Zosyn 3.375 gms iv q8 hrs  s/p IR drainage of 1 abscess, awaiting results of culture  will likely need a PICC line for IV abxs at home.           Acute perforated cholecystitis   Gallbladder abscess  Leukocytosis - normalized    Plan: Continue Zosyn 3.375 gms iv q8 hrs  s/p IR drainage of 1 abscess, no specimen testing at this point  will likely need a PICC line for IV abxs at home for a total of 3 weeks           Acute perforated cholecystitis   Gallbladder abscess  Leukocytosis - normalized    Plan: Continue Zosyn 3.375 gms iv q8 hrs  s/p IR drainage of 1 abscess, no specimen testing at this point  will likely need a PICC line for IV abxs at home for a total of 3 weeks ( Invanz 1 gm iv q24 hrs, give 1st dose tomorrow).    I agree with above

## 2021-04-26 NOTE — PROGRESS NOTE ADULT - SUBJECTIVE AND OBJECTIVE BOX
71y Male is under our care for     REVIEW OF SYSTEMS:  [  ] Not able to elicit  General:	  Chest:	  GI:	  :  Skin:	  Musculoskeletal:	  Neuro:	    MEDS:  piperacillin/tazobactam IVPB.. 3.375 Gram(s) IV Intermittent every 8 hours    ALLERGIES: Allergies    No Known Allergies    Intolerances        VITALS:  Vital Signs Last 24 Hrs  T(C): 36.6 (26 Apr 2021 05:57), Max: 36.7 (25 Apr 2021 12:40)  T(F): 97.9 (26 Apr 2021 05:57), Max: 98.1 (25 Apr 2021 12:40)  HR: 68 (26 Apr 2021 05:57) (61 - 68)  BP: 148/62 (26 Apr 2021 05:57) (145/66 - 165/79)  BP(mean): 108 (25 Apr 2021 21:32) (108 - 108)  RR: 18 (26 Apr 2021 05:57) (17 - 18)  SpO2: 97% (26 Apr 2021 05:57) (96% - 100%)      PHYSICAL EXAM:  HEENT:  Neck:  Respiratory:  Cardiovascular:  Gastrointestinal:  Extremities:  Skin:  Ortho:  Neuro:    LABS/DIAGNOSTIC TESTS:                        11.0   7.01  )-----------( 373      ( 26 Apr 2021 05:52 )             34.9     WBC Count: 7.01 K/uL (04-26 @ 05:52)  WBC Count: 8.02 K/uL (04-25 @ 06:21)  WBC Count: 9.06 K/uL (04-24 @ 07:08)  WBC Count: 11.31 K/uL (04-23 @ 05:44)  WBC Count: 10.18 K/uL (04-22 @ 06:40)    04-26    138  |  105  |  5<L>  ----------------------------<  106<H>  4.3   |  28  |  1.11    Ca    8.5      26 Apr 2021 05:52    TPro  6.3  /  Alb  2.3<L>  /  TBili  0.4  /  DBili  x   /  AST  22  /  ALT  28  /  AlkPhos  72  04-26      CULTURES:   .Urine Clean Catch (Midstream)  04-20 @ 03:18   No growth  --  --        RADIOLOGY:  no new studies 71y Male is under our care for acute perforated cholecystitis with gallbladder abscess and leukocytosis.  Patient was seen laying comfortably in bed with no acute distress. Patient is s/p IR drainage on 4/23, awaiting results of the cultures.  Patient remains afebrile and WBC count is WNL.    REVIEW OF SYSTEMS:  [  ] Not able to elicit  General: no fevers no malaise  Chest: no cough no sob  GI: no nvd, mild RLQ pain +  : no urinary sxs   Skin: no rashes  Musculoskeletal: no trauma no LBP  Neuro: no ha's no dizziness     MEDS:  piperacillin/tazobactam IVPB.. 3.375 Gram(s) IV Intermittent every 8 hours    ALLERGIES: Allergies    No Known Allergies    Intolerances        VITALS:  Vital Signs Last 24 Hrs  T(C): 36.6 (26 Apr 2021 05:57), Max: 36.7 (25 Apr 2021 12:40)  T(F): 97.9 (26 Apr 2021 05:57), Max: 98.1 (25 Apr 2021 12:40)  HR: 68 (26 Apr 2021 05:57) (61 - 68)  BP: 148/62 (26 Apr 2021 05:57) (145/66 - 165/79)  BP(mean): 108 (25 Apr 2021 21:32) (108 - 108)  RR: 18 (26 Apr 2021 05:57) (17 - 18)  SpO2: 97% (26 Apr 2021 05:57) (96% - 100%)      PHYSICAL EXAM:  Neck: supple no LN's   Respiratory: lungs clear no rales  Cardiovascular: S1 S2 reg no murmurs  Gastrointestinal: +BS with soft, nondistended abdomen; mild RLQ tenderness +   Extremities: no edema  Skin: no rashes  Ortho: n/a  Neuro: AAO x 4    LABS/DIAGNOSTIC TESTS:                        11.0   7.01  )-----------( 373      ( 26 Apr 2021 05:52 )             34.9     WBC Count: 7.01 K/uL (04-26 @ 05:52)  WBC Count: 8.02 K/uL (04-25 @ 06:21)  WBC Count: 9.06 K/uL (04-24 @ 07:08)  WBC Count: 11.31 K/uL (04-23 @ 05:44)  WBC Count: 10.18 K/uL (04-22 @ 06:40)    04-26    138  |  105  |  5<L>  ----------------------------<  106<H>  4.3   |  28  |  1.11    Ca    8.5      26 Apr 2021 05:52    TPro  6.3  /  Alb  2.3<L>  /  TBili  0.4  /  DBili  x   /  AST  22  /  ALT  28  /  AlkPhos  72  04-26      CULTURES:   .Urine Clean Catch (Midstream)  04-20 @ 03:18   No growth  --  --        RADIOLOGY:  no new studies 71y Male is under our care for acute perforated cholecystitis with gallbladder abscess and leukocytosis.  Patient was seen laying comfortably in bed with no acute distress. Patient is s/p IR drainage on 4/23,  and he remains afebrile with WBC count WNL.    REVIEW OF SYSTEMS:  [  ] Not able to elicit  General: no fevers no malaise  Chest: no cough no sob  GI: no nvd, mild RLQ pain +  : no urinary sxs   Skin: no rashes  Musculoskeletal: no trauma no LBP  Neuro: no ha's no dizziness     MEDS:  piperacillin/tazobactam IVPB.. 3.375 Gram(s) IV Intermittent every 8 hours    ALLERGIES: Allergies    No Known Allergies    Intolerances        VITALS:  Vital Signs Last 24 Hrs  T(C): 36.6 (26 Apr 2021 05:57), Max: 36.7 (25 Apr 2021 12:40)  T(F): 97.9 (26 Apr 2021 05:57), Max: 98.1 (25 Apr 2021 12:40)  HR: 68 (26 Apr 2021 05:57) (61 - 68)  BP: 148/62 (26 Apr 2021 05:57) (145/66 - 165/79)  BP(mean): 108 (25 Apr 2021 21:32) (108 - 108)  RR: 18 (26 Apr 2021 05:57) (17 - 18)  SpO2: 97% (26 Apr 2021 05:57) (96% - 100%)      PHYSICAL EXAM:  Neck: supple no LN's   Respiratory: lungs clear no rales  Cardiovascular: S1 S2 reg no murmurs  Gastrointestinal: +BS with soft, nondistended abdomen; mild RLQ tenderness +   Extremities: no edema  Skin: no rashes  Ortho: n/a  Neuro: AAO x 4    LABS/DIAGNOSTIC TESTS:                        11.0   7.01  )-----------( 373      ( 26 Apr 2021 05:52 )             34.9     WBC Count: 7.01 K/uL (04-26 @ 05:52)  WBC Count: 8.02 K/uL (04-25 @ 06:21)  WBC Count: 9.06 K/uL (04-24 @ 07:08)  WBC Count: 11.31 K/uL (04-23 @ 05:44)  WBC Count: 10.18 K/uL (04-22 @ 06:40)    04-26    138  |  105  |  5<L>  ----------------------------<  106<H>  4.3   |  28  |  1.11    Ca    8.5      26 Apr 2021 05:52    TPro  6.3  /  Alb  2.3<L>  /  TBili  0.4  /  DBili  x   /  AST  22  /  ALT  28  /  AlkPhos  72  04-26      CULTURES:   .Urine Clean Catch (Midstream)  04-20 @ 03:18   No growth  --  --        RADIOLOGY:  no new studies

## 2021-04-26 NOTE — PROGRESS NOTE ADULT - SUBJECTIVE AND OBJECTIVE BOX
INTERVAL HPI/OVERNIGHT EVENTS:  Patient seen and examined at [facility]  Interpretor #[id number]  Pt resting comfortably. No acute complaints.   Tolerating diet.   Denies N/V    MEDICATIONS  (STANDING):  dextrose 5% + sodium chloride 0.45% with potassium chloride 20 mEq/L 1000 milliLiter(s) (110 mL/Hr) IV Continuous <Continuous>  lactated ringers. 500 milliLiter(s) (999 mL/Hr) IV Continuous <Continuous>  metoprolol succinate ER 25 milliGRAM(s) Oral daily  piperacillin/tazobactam IVPB.. 3.375 Gram(s) IV Intermittent every 8 hours    MEDICATIONS  (PRN):  morphine  - Injectable 2 milliGRAM(s) IV Push every 4 hours PRN Severe Pain (7 - 10)  ondansetron Injectable 4 milliGRAM(s) IV Push every 6 hours PRN Nausea      Vital Signs Last 24 Hrs  T(C): 36.6 (26 Apr 2021 05:57), Max: 36.7 (25 Apr 2021 12:40)  T(F): 97.9 (26 Apr 2021 05:57), Max: 98.1 (25 Apr 2021 12:40)  HR: 68 (26 Apr 2021 05:57) (61 - 68)  BP: 148/62 (26 Apr 2021 05:57) (145/66 - 165/79)  BP(mean): 108 (25 Apr 2021 21:32) (108 - 108)  RR: 18 (26 Apr 2021 05:57) (17 - 18)  SpO2: 97% (26 Apr 2021 05:57) (96% - 100%)    Physical:  General: A&Ox3. NAD.  Respirations: Unlabored  Abdomen: Soft nondistended, appropriate tenderness at IR aspiration site, dressing C/D/I, no rebound, no guarding     I&O's Detail    25 Apr 2021 07:01  -  26 Apr 2021 07:00  --------------------------------------------------------  IN:    dextrose 5% + sodium chloride 0.45% w/ Additives: 1210 mL  Total IN: 1210 mL    OUT:  Total OUT: 0 mL    Total NET: 1210 mL          LABS:                        11.0   7.01  )-----------( 373      ( 26 Apr 2021 05:52 )             34.9             04-26    138  |  105  |  5<L>  ----------------------------<  106<H>  4.3   |  28  |  1.11    Ca    8.5      26 Apr 2021 05:52    TPro  6.3  /  Alb  2.3<L>  /  TBili  0.4  /  DBili  x   /  AST  22  /  ALT  28  /  AlkPhos  72  04-26

## 2021-04-26 NOTE — PROGRESS NOTE ADULT - ASSESSMENT
71 year old male s/p IR aspiration of abscess 4/23 due to perforated cholecystitis  -IV Abx  -Regular diet   -Awaiting cultures from IR aspiration  -D/c planning when culture back, will adjust antibiotics accordingly  -Pain control PRN   -DVT ppx/OOB  -Will discuss with Dr. Reed

## 2021-04-27 ENCOUNTER — TRANSCRIPTION ENCOUNTER (OUTPATIENT)
Age: 71
End: 2021-04-27

## 2021-04-27 VITALS
RESPIRATION RATE: 17 BRPM | SYSTOLIC BLOOD PRESSURE: 158 MMHG | TEMPERATURE: 98 F | HEART RATE: 66 BPM | OXYGEN SATURATION: 97 % | DIASTOLIC BLOOD PRESSURE: 83 MMHG

## 2021-04-27 LAB
ALBUMIN SERPL ELPH-MCNC: 2.8 G/DL — LOW (ref 3.5–5)
ALP SERPL-CCNC: 73 U/L — SIGNIFICANT CHANGE UP (ref 40–120)
ALT FLD-CCNC: 28 U/L DA — SIGNIFICANT CHANGE UP (ref 10–60)
ANION GAP SERPL CALC-SCNC: 4 MMOL/L — LOW (ref 5–17)
AST SERPL-CCNC: 18 U/L — SIGNIFICANT CHANGE UP (ref 10–40)
BILIRUB SERPL-MCNC: 0.3 MG/DL — SIGNIFICANT CHANGE UP (ref 0.2–1.2)
BUN SERPL-MCNC: 8 MG/DL — SIGNIFICANT CHANGE UP (ref 7–18)
CALCIUM SERPL-MCNC: 9 MG/DL — SIGNIFICANT CHANGE UP (ref 8.4–10.5)
CHLORIDE SERPL-SCNC: 106 MMOL/L — SIGNIFICANT CHANGE UP (ref 96–108)
CO2 SERPL-SCNC: 29 MMOL/L — SIGNIFICANT CHANGE UP (ref 22–31)
CREAT SERPL-MCNC: 1.22 MG/DL — SIGNIFICANT CHANGE UP (ref 0.5–1.3)
GLUCOSE SERPL-MCNC: 99 MG/DL — SIGNIFICANT CHANGE UP (ref 70–99)
HCT VFR BLD CALC: 37.6 % — LOW (ref 39–50)
HGB BLD-MCNC: 11.9 G/DL — LOW (ref 13–17)
MCHC RBC-ENTMCNC: 30.7 PG — SIGNIFICANT CHANGE UP (ref 27–34)
MCHC RBC-ENTMCNC: 31.6 GM/DL — LOW (ref 32–36)
MCV RBC AUTO: 97.2 FL — SIGNIFICANT CHANGE UP (ref 80–100)
NRBC # BLD: 0 /100 WBCS — SIGNIFICANT CHANGE UP (ref 0–0)
PLATELET # BLD AUTO: 380 K/UL — SIGNIFICANT CHANGE UP (ref 150–400)
POTASSIUM SERPL-MCNC: 4.1 MMOL/L — SIGNIFICANT CHANGE UP (ref 3.5–5.3)
POTASSIUM SERPL-SCNC: 4.1 MMOL/L — SIGNIFICANT CHANGE UP (ref 3.5–5.3)
PROT SERPL-MCNC: 7.1 G/DL — SIGNIFICANT CHANGE UP (ref 6–8.3)
RBC # BLD: 3.87 M/UL — LOW (ref 4.2–5.8)
RBC # FLD: 12.3 % — SIGNIFICANT CHANGE UP (ref 10.3–14.5)
SARS-COV-2 RNA SPEC QL NAA+PROBE: SIGNIFICANT CHANGE UP
SODIUM SERPL-SCNC: 139 MMOL/L — SIGNIFICANT CHANGE UP (ref 135–145)
WBC # BLD: 7.11 K/UL — SIGNIFICANT CHANGE UP (ref 3.8–10.5)
WBC # FLD AUTO: 7.11 K/UL — SIGNIFICANT CHANGE UP (ref 3.8–10.5)

## 2021-04-27 PROCEDURE — 85025 COMPLETE CBC W/AUTO DIFF WBC: CPT

## 2021-04-27 PROCEDURE — 87086 URINE CULTURE/COLONY COUNT: CPT

## 2021-04-27 PROCEDURE — 87635 SARS-COV-2 COVID-19 AMP PRB: CPT

## 2021-04-27 PROCEDURE — 76705 ECHO EXAM OF ABDOMEN: CPT

## 2021-04-27 PROCEDURE — 83605 ASSAY OF LACTIC ACID: CPT

## 2021-04-27 PROCEDURE — 85027 COMPLETE CBC AUTOMATED: CPT

## 2021-04-27 PROCEDURE — 36415 COLL VENOUS BLD VENIPUNCTURE: CPT

## 2021-04-27 PROCEDURE — 86803 HEPATITIS C AB TEST: CPT

## 2021-04-27 PROCEDURE — 87070 CULTURE OTHR SPECIMN AEROBIC: CPT

## 2021-04-27 PROCEDURE — 93005 ELECTROCARDIOGRAM TRACING: CPT

## 2021-04-27 PROCEDURE — 88112 CYTOPATH CELL ENHANCE TECH: CPT

## 2021-04-27 PROCEDURE — 71045 X-RAY EXAM CHEST 1 VIEW: CPT

## 2021-04-27 PROCEDURE — 99285 EMERGENCY DEPT VISIT HI MDM: CPT | Mod: 25

## 2021-04-27 PROCEDURE — 82378 CARCINOEMBRYONIC ANTIGEN: CPT

## 2021-04-27 PROCEDURE — 84484 ASSAY OF TROPONIN QUANT: CPT

## 2021-04-27 PROCEDURE — 86301 IMMUNOASSAY TUMOR CA 19-9: CPT

## 2021-04-27 PROCEDURE — 10160 PNXR ASPIR ABSC HMTMA BULLA: CPT

## 2021-04-27 PROCEDURE — 85730 THROMBOPLASTIN TIME PARTIAL: CPT

## 2021-04-27 PROCEDURE — 86769 SARS-COV-2 COVID-19 ANTIBODY: CPT

## 2021-04-27 PROCEDURE — 36573 INSJ PICC RS&I 5 YR+: CPT

## 2021-04-27 PROCEDURE — 83690 ASSAY OF LIPASE: CPT

## 2021-04-27 PROCEDURE — 74177 CT ABD & PELVIS W/CONTRAST: CPT

## 2021-04-27 PROCEDURE — 87205 SMEAR GRAM STAIN: CPT

## 2021-04-27 PROCEDURE — 96375 TX/PRO/DX INJ NEW DRUG ADDON: CPT

## 2021-04-27 PROCEDURE — 96374 THER/PROPH/DIAG INJ IV PUSH: CPT

## 2021-04-27 PROCEDURE — C1751: CPT

## 2021-04-27 PROCEDURE — 80053 COMPREHEN METABOLIC PANEL: CPT

## 2021-04-27 PROCEDURE — 81001 URINALYSIS AUTO W/SCOPE: CPT

## 2021-04-27 PROCEDURE — 80048 BASIC METABOLIC PNL TOTAL CA: CPT

## 2021-04-27 PROCEDURE — 88305 TISSUE EXAM BY PATHOLOGIST: CPT

## 2021-04-27 PROCEDURE — 86850 RBC ANTIBODY SCREEN: CPT

## 2021-04-27 PROCEDURE — 86901 BLOOD TYPING SEROLOGIC RH(D): CPT

## 2021-04-27 PROCEDURE — 87641 MR-STAPH DNA AMP PROBE: CPT

## 2021-04-27 PROCEDURE — 86900 BLOOD TYPING SEROLOGIC ABO: CPT

## 2021-04-27 PROCEDURE — 77012 CT SCAN FOR NEEDLE BIOPSY: CPT

## 2021-04-27 PROCEDURE — 87640 STAPH A DNA AMP PROBE: CPT

## 2021-04-27 PROCEDURE — 85610 PROTHROMBIN TIME: CPT

## 2021-04-27 RX ORDER — CHLORHEXIDINE GLUCONATE 213 G/1000ML
1 SOLUTION TOPICAL DAILY
Refills: 0 | Status: DISCONTINUED | OUTPATIENT
Start: 2021-04-28 | End: 2021-04-27

## 2021-04-27 RX ORDER — SODIUM CHLORIDE 9 MG/ML
0 INJECTION INTRAMUSCULAR; INTRAVENOUS; SUBCUTANEOUS
Qty: 0 | Refills: 0 | DISCHARGE
Start: 2021-04-27

## 2021-04-27 RX ORDER — ERTAPENEM SODIUM 1 G/1
1000 INJECTION, POWDER, LYOPHILIZED, FOR SOLUTION INTRAMUSCULAR; INTRAVENOUS ONCE
Refills: 0 | Status: COMPLETED | OUTPATIENT
Start: 2021-04-27 | End: 2021-04-27

## 2021-04-27 RX ORDER — ERTAPENEM SODIUM 1 G/1
1 INJECTION, POWDER, LYOPHILIZED, FOR SOLUTION INTRAMUSCULAR; INTRAVENOUS
Qty: 21 | Refills: 0
Start: 2021-04-27 | End: 2021-05-17

## 2021-04-27 RX ORDER — SODIUM CHLORIDE 9 MG/ML
10 INJECTION INTRAMUSCULAR; INTRAVENOUS; SUBCUTANEOUS
Refills: 0 | Status: DISCONTINUED | OUTPATIENT
Start: 2021-04-27 | End: 2021-04-27

## 2021-04-27 RX ADMIN — ERTAPENEM SODIUM 120 MILLIGRAM(S): 1 INJECTION, POWDER, LYOPHILIZED, FOR SOLUTION INTRAMUSCULAR; INTRAVENOUS at 13:16

## 2021-04-27 RX ADMIN — Medication 25 MILLIGRAM(S): at 06:06

## 2021-04-27 RX ADMIN — PIPERACILLIN AND TAZOBACTAM 25 GRAM(S): 4; .5 INJECTION, POWDER, LYOPHILIZED, FOR SOLUTION INTRAVENOUS at 06:04

## 2021-04-27 NOTE — PROCEDURE NOTE - PROCEDURE FINDINGS AND DETAILS
Small RUQ abdominal collection again identified. 8cc of purulent appearing fluid aspirated and sent for culture and cytology, as requested.
42 cc 4 Fr PICC Inserted via left brachial vein

## 2021-04-27 NOTE — DISCHARGE NOTE NURSING/CASE MANAGEMENT/SOCIAL WORK - PATIENT PORTAL LINK FT
You can access the FollowMyHealth Patient Portal offered by [facility] by registering at the following website: [email or web address]. By joining [facility]’s FollowMyHealth portal, you will also be able to view your health information using other applications (apps) compatible with our system.

## 2021-04-27 NOTE — DISCHARGE NOTE PROVIDER - NSDCCPCAREPLAN_GEN_ALL_CORE_FT
PRINCIPAL DISCHARGE DIAGNOSIS  Diagnosis: Abscess, gallbladder  Assessment and Plan of Treatment: You were admitted with a perforated gallbladder, you were treated with IV antibiotics and you clinically improved   Please follow up with Dr. Reed within 2 weeks   Continue IV antibiotics for 3 weeks   No heavy lifting or straining   Please resume all meds and follow up with PCP within 1 week         SECONDARY DISCHARGE DIAGNOSES  Diagnosis: UTI (urinary tract infection)  Assessment and Plan of Treatment:

## 2021-04-27 NOTE — PROGRESS NOTE ADULT - SUBJECTIVE AND OBJECTIVE BOX
71y Male is under our care for     REVIEW OF SYSTEMS:  [  ] Not able to elicit  General:	  Chest:	  GI:	  :  Skin:	  Musculoskeletal:	  Neuro:	    MEDS:  piperacillin/tazobactam IVPB.. 3.375 Gram(s) IV Intermittent every 8 hours    ALLERGIES: Allergies    No Known Allergies    Intolerances        VITALS:  Vital Signs Last 24 Hrs  T(C): 36.5 (27 Apr 2021 05:10), Max: 36.7 (26 Apr 2021 13:06)  T(F): 97.7 (27 Apr 2021 05:10), Max: 98.1 (26 Apr 2021 21:18)  HR: 65 (27 Apr 2021 05:10) (65 - 68)  BP: 156/82 (27 Apr 2021 05:10) (153/66 - 156/82)  BP(mean): --  RR: 18 (27 Apr 2021 05:10) (17 - 18)  SpO2: 96% (27 Apr 2021 05:10) (96% - 99%)      PHYSICAL EXAM:  HEENT:  Neck:  Respiratory:  Cardiovascular:  Gastrointestinal:  Extremities:  Skin:  Ortho:  Neuro:    LABS/DIAGNOSTIC TESTS:                        11.9   7.11  )-----------( 380      ( 27 Apr 2021 08:12 )             37.6     WBC Count: 7.11 K/uL (04-27 @ 08:12)  WBC Count: 7.01 K/uL (04-26 @ 05:52)  WBC Count: 8.02 K/uL (04-25 @ 06:21)  WBC Count: 9.06 K/uL (04-24 @ 07:08)  WBC Count: 11.31 K/uL (04-23 @ 05:44)    04-27    139  |  106  |  8   ----------------------------<  99  4.1   |  29  |  1.22    Ca    9.0      27 Apr 2021 08:12    TPro  7.1  /  Alb  2.8<L>  /  TBili  0.3  /  DBili  x   /  AST  18  /  ALT  28  /  AlkPhos  73  04-27      CULTURES:   .Abscess abdominal collection  04-26 @ 10:06 --  --    Few polymorphonuclear leukocytes per low power field  No organisms seen per oil power field      .Urine Clean Catch (Midstream)  04-20 @ 03:18   No growth  --  --        RADIOLOGY:  no new studies 71y Male is under our care for acute perforated cholecystitis with gallbladder abscess and leukocytosis.  Patient was seen laying comfortably in bed with no acute distress. He is s/p left brachial PICC line, remains afebrile and patient verbalized improvement in abdominal tenderness.    REVIEW OF SYSTEMS:  [  ] Not able to elicit  General: no fevers no malaise  Chest: no cough no sob  GI: no nvd, mild RLQ pain +  : no urinary sxs   Skin: no rashes  Musculoskeletal: no trauma no LBP  Neuro: no ha's no dizziness 	    MEDS:  piperacillin/tazobactam IVPB.. 3.375 Gram(s) IV Intermittent every 8 hours    ALLERGIES: Allergies    No Known Allergies    Intolerances        VITALS:  Vital Signs Last 24 Hrs  T(C): 36.5 (27 Apr 2021 05:10), Max: 36.7 (26 Apr 2021 13:06)  T(F): 97.7 (27 Apr 2021 05:10), Max: 98.1 (26 Apr 2021 21:18)  HR: 65 (27 Apr 2021 05:10) (65 - 68)  BP: 156/82 (27 Apr 2021 05:10) (153/66 - 156/82)  BP(mean): --  RR: 18 (27 Apr 2021 05:10) (17 - 18)  SpO2: 96% (27 Apr 2021 05:10) (96% - 99%)      PHYSICAL EXAM:  Neck: supple no LN's   Respiratory: lungs clear no rales  Cardiovascular: S1 S2 reg no murmurs  Gastrointestinal: +BS with soft, nondistended abdomen; mild RLQ tenderness +   Extremities: no edema, left brachial PICC line  Skin: no rashes  Ortho: n/a  Neuro: AAO x 4    LABS/DIAGNOSTIC TESTS:                        11.9   7.11  )-----------( 380      ( 27 Apr 2021 08:12 )             37.6     WBC Count: 7.11 K/uL (04-27 @ 08:12)  WBC Count: 7.01 K/uL (04-26 @ 05:52)  WBC Count: 8.02 K/uL (04-25 @ 06:21)  WBC Count: 9.06 K/uL (04-24 @ 07:08)  WBC Count: 11.31 K/uL (04-23 @ 05:44)    04-27    139  |  106  |  8   ----------------------------<  99  4.1   |  29  |  1.22    Ca    9.0      27 Apr 2021 08:12    TPro  7.1  /  Alb  2.8<L>  /  TBili  0.3  /  DBili  x   /  AST  18  /  ALT  28  /  AlkPhos  73  04-27      CULTURES:   .Abscess abdominal collection  04-26 @ 10:06 --  --    Few polymorphonuclear leukocytes per low power field  No organisms seen per oil power field      .Urine Clean Catch (Midstream)  04-20 @ 03:18   No growth  --  --        RADIOLOGY:  no new studies

## 2021-04-27 NOTE — DISCHARGE NOTE PROVIDER - NSDCMRMEDTOKEN_GEN_ALL_CORE_FT
acetaminophen-oxycodone 325 mg-5 mg oral tablet: 1 tab(s) orally every 4 hours- for moderate pain. Do not drive, drink alcohol, or operate machinery with this medication.  Percocet 5/325 325 mg-5 mg oral tablet: 1-2 tab(s) orally every 4-6 hours, As Needed   acetaminophen-oxycodone 325 mg-5 mg oral tablet: 1 tab(s) orally every 4 hours- for moderate pain. Do not drive, drink alcohol, or operate machinery with this medication.  INVanz ADD-[place] 1 g injection: 1 gram(s) intravenously every 24 hours for 21 days stop date on 05/19/2021  Please flush picc with 10ml of normal saline pre and post infusion   May d/c picc after completion of antibiotics   Percocet 5/325 325 mg-5 mg oral tablet: 1-2 tab(s) orally every 4-6 hours, As Needed  sodium chloride 0.9% injectable solution:  injectable

## 2021-04-27 NOTE — PROGRESS NOTE ADULT - ASSESSMENT
71 year old male w/ cholecystitis and abscess, s/p IR aspiration of abscess 4/23    -PICC line w/ IR today   -IV abx as per ID   -C/w Home medication   -Reg diet   -OOB/Ambulate   -DVT ppx   -Dc planning w/ IV abx  71 year old male w/ cholecystitis and abscess, s/p IR aspiration of abscess 4/23    -PICC line w/ IR today   -IV abx as per ID   -C/w Home medication   -Reg diet   -OOB/Ambulate   -DVT ppx   -Dc planning w/ IV abx     ADDENDUM / covering    pt seen in am  stable  no abdominal pain  IV abx as per ID  d/c home

## 2021-04-27 NOTE — PROGRESS NOTE ADULT - ASSESSMENT
Acute perforated cholecystitis   Gallbladder abscess  Leukocytosis - normalized    Plan: Continue Zosyn 3.375 gms iv q8 hrs  s/p IR drainage of 1 abscess  will likely need a PICC line for IV abxs at home for a total of 3 weeks ( Invanz 1 gm iv q24 hrs, give 1st dose today).               Acute perforated cholecystitis   Gallbladder abscess  Leukocytosis - normalized    Plan: Continue Zosyn 3.375 gms iv q8 hrs  s/p IR drainage of 1 abscess  will need IV abxs at home for a total of 3 weeks ( Invanz 1 gm iv q24 hrs, give 1st dose today).  Abscess culture results still pending               Acute perforated cholecystitis   Gallbladder abscess  Leukocytosis - normalized    Plan: Continue Zosyn 3.375 gms iv q8 hrs  s/p IR drainage of 1 abscess  will need IV abxs at home for a total of 3 weeks ( Invanz 1 gm iv q24 hrs, give 1st dose today).  Abscess culture results still pending    I agree with above

## 2021-04-27 NOTE — PROGRESS NOTE ADULT - NSICDXPILOT_GEN_ALL_CORE
[place]

## 2021-04-27 NOTE — DISCHARGE NOTE PROVIDER - HOSPITAL COURSE
71yoM with PMHx of HTN, HLD, presents to the ED with Abdominal Pain x 2 days. Started suddenly pain for about 1 week but increasingly worse in the last 2 days , Located in the RUQ  ,described as constant, worsened by leaning over.  Denies CP, SOB, vomiting, bloody stool, dys/hematuria, fever, and all other symptoms at this time He was found to have what appears to be acute Cholecystitis with a perforated gallbladder and a gallbladder fossa abscess. He is on Zosyn currently and is doing slightly better. He is to be evaluated by IR and underwent aspiration of abscess  Patient for d/c home on IV antibiotics for 3 weeks and home care services

## 2021-04-27 NOTE — PROGRESS NOTE ADULT - PROVIDER SPECIALTY LIST ADULT
Infectious Disease
Surgery
Infectious Disease
Infectious Disease
Intervent Radiology
Surgery
Infectious Disease
Surgery
Infectious Disease
Infectious Disease
Surgery

## 2021-04-27 NOTE — PROGRESS NOTE ADULT - SUBJECTIVE AND OBJECTIVE BOX
INTERVAL HPI/OVERNIGHT EVENTS:    Pt seen and examined at bedside. Admits to some discomfort in RUQ, overall feels much better. Denies nausea or vomiting, no fever, chills, SOB or CP. On regular diet tolerating well.     Vital Signs Last 24 Hrs  T(C): 36.5 (27 Apr 2021 05:10), Max: 36.7 (26 Apr 2021 13:06)  T(F): 97.7 (27 Apr 2021 05:10), Max: 98.1 (26 Apr 2021 21:18)  HR: 65 (27 Apr 2021 05:10) (65 - 68)  BP: 156/82 (27 Apr 2021 05:10) (153/66 - 156/82)  BP(mean): --  RR: 18 (27 Apr 2021 05:10) (17 - 18)  SpO2: 96% (27 Apr 2021 05:10) (96% - 99%)  I&O's Detail    26 Apr 2021 07:01  -  27 Apr 2021 07:00  --------------------------------------------------------  IN:    dextrose 5% + sodium chloride 0.45% w/ Additives: 1320 mL    IV PiggyBack: 200 mL  Total IN: 1520 mL    OUT:  Total OUT: 0 mL    Total NET: 1520 mL      piperacillin/tazobactam IVPB.. 3.375 Gram(s) IV Intermittent every 8 hours      Physical Exam  General: AAOx3, No acute distress  Skin: No jaundice, no icterus  Abdomen: soft,  nondistended, nontender, no rebound tenderness, no guarding, no palpable masses  Extremities: non edematous, no calf pain bilaterally        Labs:                        11.9   7.11  )-----------( 380      ( 27 Apr 2021 08:12 )             37.6     04-27    139  |  106  |  8   ----------------------------<  99  4.1   |  29  |  1.22    Ca    9.0      27 Apr 2021 08:12    TPro  7.1  /  Alb  2.8<L>  /  TBili  0.3  /  DBili  x   /  AST  18  /  ALT  28  /  AlkPhos  73  04-27

## 2021-04-28 LAB
MRSA PCR RESULT.: SIGNIFICANT CHANGE UP
S AUREUS DNA NOSE QL NAA+PROBE: SIGNIFICANT CHANGE UP

## 2021-04-29 PROBLEM — Z00.00 ENCOUNTER FOR PREVENTIVE HEALTH EXAMINATION: Status: ACTIVE | Noted: 2021-04-29

## 2021-04-29 PROBLEM — E78.5 HYPERLIPIDEMIA, UNSPECIFIED: Chronic | Status: ACTIVE | Noted: 2021-04-20

## 2021-05-01 LAB
CULTURE RESULTS: NO GROWTH — SIGNIFICANT CHANGE UP
SPECIMEN SOURCE: SIGNIFICANT CHANGE UP

## 2021-05-05 ENCOUNTER — APPOINTMENT (OUTPATIENT)
Dept: SURGERY | Facility: CLINIC | Age: 71
End: 2021-05-05
Payer: COMMERCIAL

## 2021-05-05 VITALS
BODY MASS INDEX: 21.67 KG/M2 | HEIGHT: 72 IN | WEIGHT: 160 LBS | SYSTOLIC BLOOD PRESSURE: 174 MMHG | HEART RATE: 73 BPM | DIASTOLIC BLOOD PRESSURE: 91 MMHG

## 2021-05-05 DIAGNOSIS — I10 ESSENTIAL (PRIMARY) HYPERTENSION: ICD-10-CM

## 2021-05-05 PROCEDURE — 99213 OFFICE O/P EST LOW 20 MIN: CPT

## 2021-05-05 RX ORDER — ERTAPENEM SODIUM 1 G/1
1 INJECTION, POWDER, LYOPHILIZED, FOR SOLUTION INTRAMUSCULAR; INTRAVENOUS
Refills: 0 | Status: ACTIVE | COMMUNITY

## 2021-05-05 RX ORDER — CARVEDILOL 3.12 MG/1
TABLET, FILM COATED ORAL
Refills: 0 | Status: ACTIVE | COMMUNITY

## 2021-05-06 PROBLEM — I10 HBP (HIGH BLOOD PRESSURE): Status: ACTIVE | Noted: 2021-05-05

## 2021-05-20 NOTE — ASSESSMENT
[FreeTextEntry1] : GELA PEREYRA is a 71 year old male with acute Cholecystitis with a perforated gallbladder and a gallbladder fossa abscess. Today patient is doing well, offers no complaints. Denies any fevers, chills, nausea, vomiting, diarrhea or constipation. Patient will continue to IV antibiotics via picc line and will RTO in 3 weeks for repeat CT of the abdomen and pelvis and possibly to be scheduled for Laparoscopic possible cholecystectomy.

## 2021-05-20 NOTE — HISTORY OF PRESENT ILLNESS
[de-identified] : GELA PEREYRA is a 71 year old male  with PMH HTN, HLD, who presents in the office for post hospitalization. Patient was admitted to [facility] for abdominal pain. Patient was diagnosed with acute Cholecystitis with a perforated gallbladder and a gallbladder fossa abscess. He underwent an IR  aspiration of abscess. Patient for d/c home on IV antibiotics for 3 weeks and home care services. Today patient is doing well, offers no complaints. Denies any fevers, chills, nausea, vomiting, diarrhea or constipation. Patient able to tolerate regular diet with normal bowel movements. He denies any pain.\par   \par

## 2021-05-20 NOTE — PLAN
[FreeTextEntry1] : Please follow up at the office within 3 weeks  and as needed for any questions or concerns

## 2021-05-20 NOTE — PHYSICAL EXAM
[No Rash or Lesion] : No rash or lesion [Alert] : alert [Oriented to Person] : oriented to person [Oriented to Place] : oriented to place [Oriented to Time] : oriented to time [Calm] : calm [de-identified] : The patient is alert, well-groomed, well developed and cheerful.  [de-identified] : Head is normocephalic. Conjunctiva pink, anicteric. Nasal mucosa pink, septum midline. Oral mucosa pink. Tongue midline, pharynx without exudates. \par \par   [de-identified] : Neck supple. Trachea midline. Thyroid isthmus barely palpable, lobes not felt.\par   [de-identified] : Breath sounds equal and bilateral, no wheezing no rales or rhonchi  [de-identified] :  good S1, S2, no m/r/g bilateral  [de-identified] : Normoactive bowel sounds, soft and nontender, no hepatosplenomegaly or masses noted, [de-identified] : WNL

## 2021-05-26 ENCOUNTER — APPOINTMENT (OUTPATIENT)
Dept: SURGERY | Facility: CLINIC | Age: 71
End: 2021-05-26
Payer: COMMERCIAL

## 2021-05-26 VITALS — HEART RATE: 70 BPM | SYSTOLIC BLOOD PRESSURE: 169 MMHG | OXYGEN SATURATION: 96 % | DIASTOLIC BLOOD PRESSURE: 81 MMHG

## 2021-05-26 VITALS — BODY MASS INDEX: 25.19 KG/M2 | WEIGHT: 186 LBS | HEIGHT: 72 IN

## 2021-05-26 PROCEDURE — 99213 OFFICE O/P EST LOW 20 MIN: CPT

## 2021-06-04 LAB
BUN SERPL-MCNC: 17 MG/DL
CREAT SERPL-MCNC: 1.27 MG/DL

## 2021-06-04 NOTE — PHYSICAL EXAM
[No Rash or Lesion] : No rash or lesion [Alert] : alert [Oriented to Person] : oriented to person [Oriented to Place] : oriented to place [Oriented to Time] : oriented to time [Calm] : calm [de-identified] : The patient is alert, well-groomed, well developed and cheerful.  [de-identified] : Head is normocephalic. Conjunctiva pink, anicteric. Nasal mucosa pink, septum midline. Oral mucosa pink. Tongue midline, pharynx without exudates. \par \par   [de-identified] : Neck supple. Trachea midline. Thyroid isthmus barely palpable, lobes not felt.\par   [de-identified] : Breath sounds equal and bilateral, no wheezing no rales or rhonchi  [de-identified] :  good S1, S2, no m/r/g bilateral  [de-identified] : Normoactive bowel sounds, soft and nontender, no hepatosplenomegaly or masses noted, [de-identified] : WNL

## 2021-06-04 NOTE — PLAN
[FreeTextEntry1] : Please follow up at the office post CT  and as needed for any questions or concerns

## 2021-06-04 NOTE — HISTORY OF PRESENT ILLNESS
[de-identified] : GELA PEREYRA is a 71 year old male who presents in the office for follow up visit Patient has acute Cholecystitis with a perforated gallbladder and a gallbladder fossa abscess. He underwent an IR aspiration of abscess. Patient was on IV antibiotics and they are now completed. He reports some mild postprandial abdominal pain. We will order CT abdomen and pelvis to evaluate the abscess.

## 2021-06-04 NOTE — ASSESSMENT
[FreeTextEntry1] : GELA PEREYRA is a 71 year old male with acute Cholecystitis with a perforated gallbladder and a gallbladder fossa abscess.\par \par \par \par Today patient is doing well, offers no complaints. Denies any fevers, chills, nausea, vomiting, diarrhea or constipation. Patient antibiotics completed last week. We will repeat CT of the abdomen and pelvis to evaluate gallbladder, colon and a gallbladder fossa abscess.

## 2021-06-11 ENCOUNTER — RESULT REVIEW (OUTPATIENT)
Age: 71
End: 2021-06-11

## 2021-06-14 ENCOUNTER — APPOINTMENT (OUTPATIENT)
Dept: CT IMAGING | Facility: HOSPITAL | Age: 71
End: 2021-06-14
Payer: COMMERCIAL

## 2021-06-14 ENCOUNTER — OUTPATIENT (OUTPATIENT)
Dept: OUTPATIENT SERVICES | Facility: HOSPITAL | Age: 71
LOS: 1 days | End: 2021-06-14
Payer: COMMERCIAL

## 2021-06-14 DIAGNOSIS — K81.0 ACUTE CHOLECYSTITIS: ICD-10-CM

## 2021-06-14 PROCEDURE — 74160 CT ABDOMEN W/CONTRAST: CPT | Mod: 26,MH

## 2021-06-14 PROCEDURE — 74160 CT ABDOMEN W/CONTRAST: CPT

## 2021-06-23 ENCOUNTER — APPOINTMENT (OUTPATIENT)
Dept: SURGERY | Facility: CLINIC | Age: 71
End: 2021-06-23
Payer: SELF-PAY

## 2021-06-23 VITALS
DIASTOLIC BLOOD PRESSURE: 82 MMHG | BODY MASS INDEX: 25.33 KG/M2 | HEART RATE: 62 BPM | HEIGHT: 72 IN | SYSTOLIC BLOOD PRESSURE: 160 MMHG | WEIGHT: 187 LBS

## 2021-06-23 VITALS — TEMPERATURE: 97.3 F

## 2021-06-23 DIAGNOSIS — K81.0 ACUTE CHOLECYSTITIS: ICD-10-CM

## 2021-06-23 DIAGNOSIS — Z78.9 OTHER SPECIFIED HEALTH STATUS: ICD-10-CM

## 2021-06-23 PROCEDURE — 99213 OFFICE O/P EST LOW 20 MIN: CPT

## 2021-07-09 NOTE — ASSESSMENT
[FreeTextEntry1] : GELA PEREYRA is a 71 year old male with acute Cholecystitis with a perforated gallbladder and a gallbladder fossa abscess.\par \par \par \par Today patient is doing well, c/o RLQ pain rated 3 out of 10. We recommend Open Cholecystectomy due to history of perforated gallbladder.  Patient is declining surgery at this time wishes to monitor the symptoms and will return to office if symptoms reoccur.

## 2021-07-09 NOTE — HISTORY OF PRESENT ILLNESS
[de-identified] : GELA PEREYRA is a 71 year old male who presents in the office for follow up visit Patient has acute Cholecystitis with a perforated gallbladder and a gallbladder fossa abscess. He underwent a CT of the abdomen and pelvis showed Resolution of previous right upper quadrant collections and resolution of pericolonic inflammation. Minimal residual omental nodularity. Contracted gallbladder. No biliary distention. Patient c/o some RLQ discomfort postdental. He stated that pain is much better from the hospital stay. We recommend Open Cholecystectomy due to history of perforated gallbladder.

## 2021-07-09 NOTE — CONSULT LETTER
[Dear  ___] : Dear  [unfilled], [Courtesy Letter:] : I had the pleasure of seeing your patient, [unfilled], in my office today. [Sincerely,] : Sincerely, [FreeTextEntry3] : Dr Reed

## 2021-07-09 NOTE — PHYSICAL EXAM
[No Rash or Lesion] : No rash or lesion [Alert] : alert [Oriented to Person] : oriented to person [Oriented to Place] : oriented to place [Oriented to Time] : oriented to time [Calm] : calm [de-identified] : The patient is alert, well-groomed, well developed and cheerful.  [de-identified] : NCAT; sclerae anicteric; [de-identified] : Neck supple. Trachea midline. Thyroid isthmus barely palpable, lobes not felt.\par   [de-identified] : Breath sounds equal and bilateral, no wheezing no rales or rhonchi  [de-identified] :  good S1, S2, no m/r/g bilateral  [de-identified] : Normoactive bowel sounds, soft and nontender, no hepatosplenomegaly or masses noted, [de-identified] : WNL

## 2022-10-19 NOTE — DISCHARGE NOTE PROVIDER - CARE PROVIDER_API CALL
Spoke with patient and notified of dose increase and prescription sent to pharmacy.    Ty Reed  General Surgery  [address] [address] [place] [id number]  Phone: [phone]  Fax: [phone]  Follow Up Time: 1 week

## 2023-08-29 NOTE — DISCHARGE NOTE PROVIDER - NSDCQMPCI_CARD_ALL_CORE
Nutrition Progress Note  (Physician Action Needed)    Physician- please edit note, check the appropriate diagnosis from list below and indicate whether you agree with the plan of care    The registered dietitian has identified that this patient meets criteria for malnutrition.    Nutrition Diagnosis:  Nutrition Diagnosis: Malnutrition  Malnutrition in the context of acute illness or injury: Non-severe (moderate)  Related to: Decreased intake, baseline smaller appetite, acute illness of C. diff    Malnutrition diagnosis acute illness/injury; non-severe: Mild depletion of body fat, Mild depletion of muscle mass, Energy intake of <75% of estimated energy requirement for >7 days  Primary Nutrition Diagnosis status: Active nutrition diagnosis    Intervention:  Intervention: Meals and snacks, Nutrition supplement therapy      Meals & snacks: NPO for procedure. Anticipate Regular diet to resume as feasible. Encouraged intake of small, frequent meals as diet allows. Nutrition tips for home added to AVS.      Nutrition supplement therapy: Patient is agreeable to resume Ensure Plus HP once daily (prefers chocolate). Patient routinely drinks Boost at home. Encouraged patient to increase intake to 2-3 times per day upon discharge until appetite improves.    Physician Documentation  Based on above, patient meets criteria for:    []  Severe Protein Calorie Malnutrition  [x]  Moderate Protein Calorie Malnutrition  []  Mild Protein Calorie Malnutrition  [] Unable to determine   [] Other:     * Please add the appropriate diagnosis to the patient's Problem List and subsequent Progress Notes.     Plan of care:   [x]  Agree with nutrition assessment and plan of care as stated above.    [] Agree with nutrition assessment and plan of care, with exception. Patient meets criteria for diagnosis except ________.    [] Disagree with nutrition assessment and plan of care because_________.         No
